# Patient Record
Sex: FEMALE | Race: WHITE | NOT HISPANIC OR LATINO | Employment: FULL TIME | URBAN - METROPOLITAN AREA
[De-identification: names, ages, dates, MRNs, and addresses within clinical notes are randomized per-mention and may not be internally consistent; named-entity substitution may affect disease eponyms.]

---

## 2017-07-28 ENCOUNTER — RECORDS - HEALTHEAST (OUTPATIENT)
Dept: LAB | Facility: CLINIC | Age: 32
End: 2017-07-28

## 2017-07-28 LAB
CHOLEST SERPL-MCNC: 149 MG/DL
FASTING STATUS PATIENT QL REPORTED: YES
HDLC SERPL-MCNC: 35 MG/DL
LDLC SERPL CALC-MCNC: 86 MG/DL
TRIGL SERPL-MCNC: 142 MG/DL

## 2017-08-04 LAB
BKR LAB AP ABNORMAL BLEEDING: NO
BKR LAB AP BIRTH CONTROL/HORMONES: NORMAL
BKR LAB AP CERVICAL APPEARANCE: NORMAL
BKR LAB AP GYN ADEQUACY: NORMAL
BKR LAB AP GYN INTERPRETATION: NORMAL
BKR LAB AP HPV REFLEX: NO
BKR LAB AP LMP: NORMAL
BKR LAB AP PATIENT STATUS: NORMAL
BKR LAB AP PREVIOUS ABNORMAL: NORMAL
BKR LAB AP PREVIOUS NORMAL: NORMAL
HIGH RISK?: NO
PATH REPORT.COMMENTS IMP SPEC: NORMAL
RESULT FLAG (HE HISTORICAL CONVERSION): NORMAL

## 2017-12-15 ENCOUNTER — OFFICE VISIT - HEALTHEAST (OUTPATIENT)
Dept: FAMILY MEDICINE | Facility: CLINIC | Age: 32
End: 2017-12-15

## 2017-12-15 DIAGNOSIS — J01.00 ACUTE NON-RECURRENT MAXILLARY SINUSITIS: ICD-10-CM

## 2017-12-15 DIAGNOSIS — J40 BRONCHITIS: ICD-10-CM

## 2017-12-18 ENCOUNTER — OFFICE VISIT - HEALTHEAST (OUTPATIENT)
Dept: FAMILY MEDICINE | Facility: CLINIC | Age: 32
End: 2017-12-18

## 2017-12-18 DIAGNOSIS — R05.9 COUGH: ICD-10-CM

## 2017-12-18 DIAGNOSIS — J32.9 SINUSITIS: ICD-10-CM

## 2018-01-25 ENCOUNTER — RECORDS - HEALTHEAST (OUTPATIENT)
Dept: ADMINISTRATIVE | Facility: OTHER | Age: 33
End: 2018-01-25

## 2018-01-25 ENCOUNTER — HOSPITAL ENCOUNTER (OUTPATIENT)
Dept: ULTRASOUND IMAGING | Facility: CLINIC | Age: 33
Discharge: HOME | End: 2018-01-25
Attending: OBSTETRICS & GYNECOLOGY

## 2018-01-25 DIAGNOSIS — Z09 FOLLOW UP: ICD-10-CM

## 2018-09-04 ENCOUNTER — OFFICE VISIT - HEALTHEAST (OUTPATIENT)
Dept: FAMILY MEDICINE | Facility: CLINIC | Age: 33
End: 2018-09-04

## 2018-09-04 DIAGNOSIS — L81.4 LENTIGO: ICD-10-CM

## 2018-09-04 DIAGNOSIS — E28.2 POLYCYSTIC OVARIES: ICD-10-CM

## 2018-09-04 DIAGNOSIS — E66.01 MORBID OBESITY WITH BMI OF 40.0-44.9, ADULT (H): ICD-10-CM

## 2018-09-04 DIAGNOSIS — L83 ACANTHOSIS NIGRICANS: ICD-10-CM

## 2018-09-04 DIAGNOSIS — Z00.00 WELL ADULT EXAM: ICD-10-CM

## 2018-09-04 DIAGNOSIS — F43.25 ADJUSTMENT REACTION WITH MIXED DISTURBANCE OF EMOTIONS AND CONDUCT: ICD-10-CM

## 2018-09-04 LAB
ALBUMIN SERPL-MCNC: 3.9 G/DL (ref 3.5–5)
ALBUMIN UR-MCNC: NEGATIVE MG/DL
ALP SERPL-CCNC: 106 U/L (ref 45–120)
ALT SERPL W P-5'-P-CCNC: 22 U/L (ref 0–45)
ANION GAP SERPL CALCULATED.3IONS-SCNC: 10 MMOL/L (ref 5–18)
APPEARANCE UR: CLEAR
AST SERPL W P-5'-P-CCNC: 16 U/L (ref 0–40)
BACTERIA #/AREA URNS HPF: ABNORMAL HPF
BASOPHILS # BLD AUTO: 0.1 THOU/UL (ref 0–0.2)
BASOPHILS NFR BLD AUTO: 1 % (ref 0–2)
BILIRUB SERPL-MCNC: 0.4 MG/DL (ref 0–1)
BILIRUB UR QL STRIP: NEGATIVE
BUN SERPL-MCNC: 13 MG/DL (ref 8–22)
CALCIUM SERPL-MCNC: 10.1 MG/DL (ref 8.5–10.5)
CHLORIDE BLD-SCNC: 107 MMOL/L (ref 98–107)
CO2 SERPL-SCNC: 25 MMOL/L (ref 22–31)
COLOR UR AUTO: YELLOW
CREAT SERPL-MCNC: 0.7 MG/DL (ref 0.6–1.1)
EOSINOPHIL # BLD AUTO: 0.3 THOU/UL (ref 0–0.4)
EOSINOPHIL NFR BLD AUTO: 2 % (ref 0–6)
ERYTHROCYTE [DISTWIDTH] IN BLOOD BY AUTOMATED COUNT: 12.3 % (ref 11–14.5)
GFR SERPL CREATININE-BSD FRML MDRD: >60 ML/MIN/1.73M2
GLUCOSE BLD-MCNC: 84 MG/DL (ref 70–125)
GLUCOSE UR STRIP-MCNC: NEGATIVE MG/DL
HCT VFR BLD AUTO: 40.6 % (ref 35–47)
HGB BLD-MCNC: 14.1 G/DL (ref 12–16)
HGB UR QL STRIP: ABNORMAL
INSULIN SERPL-ACNC: 21 MU/L (ref 3–25)
IRON SATN MFR SERPL: 21 % (ref 20–50)
IRON SERPL-MCNC: 75 UG/DL (ref 42–175)
KETONES UR STRIP-MCNC: NEGATIVE MG/DL
LEUKOCYTE ESTERASE UR QL STRIP: ABNORMAL
LYMPHOCYTES # BLD AUTO: 2.7 THOU/UL (ref 0.8–4.4)
LYMPHOCYTES NFR BLD AUTO: 24 % (ref 20–40)
MCH RBC QN AUTO: 30.9 PG (ref 27–34)
MCHC RBC AUTO-ENTMCNC: 34.6 G/DL (ref 32–36)
MCV RBC AUTO: 89 FL (ref 80–100)
MONOCYTES # BLD AUTO: 0.7 THOU/UL (ref 0–0.9)
MONOCYTES NFR BLD AUTO: 6 % (ref 2–10)
NEUTROPHILS # BLD AUTO: 7.6 THOU/UL (ref 2–7.7)
NEUTROPHILS NFR BLD AUTO: 67 % (ref 50–70)
NITRATE UR QL: NEGATIVE
PH UR STRIP: 6 [PH] (ref 5–8)
PLATELET # BLD AUTO: 301 THOU/UL (ref 140–440)
PMV BLD AUTO: 8.6 FL (ref 7–10)
POTASSIUM BLD-SCNC: 4.4 MMOL/L (ref 3.5–5)
PROT SERPL-MCNC: 6.9 G/DL (ref 6–8)
RBC # BLD AUTO: 4.55 MILL/UL (ref 3.8–5.4)
RBC #/AREA URNS AUTO: ABNORMAL HPF
SODIUM SERPL-SCNC: 142 MMOL/L (ref 136–145)
SP GR UR STRIP: 1.02 (ref 1–1.03)
SQUAMOUS #/AREA URNS AUTO: ABNORMAL LPF
T3 SERPL-MCNC: 131 NG/DL (ref 45–175)
T4 FREE SERPL-MCNC: 0.9 NG/DL (ref 0.7–1.8)
THYROID PEROXIDASE ANTIBODIES - HISTORICAL: <3 IU/ML (ref 0–5.6)
TIBC SERPL-MCNC: 352 UG/DL (ref 313–563)
TRANS CELLS #/AREA URNS HPF: ABNORMAL LPF
TRANSFERRIN SERPL-MCNC: 281 MG/DL (ref 212–360)
TSH SERPL DL<=0.005 MIU/L-ACNC: 1.98 UIU/ML (ref 0.3–5)
UROBILINOGEN UR STRIP-ACNC: ABNORMAL
VIT B12 SERPL-MCNC: 309 PG/ML (ref 213–816)
WBC #/AREA URNS AUTO: ABNORMAL HPF
WBC: 11.3 THOU/UL (ref 4–11)

## 2018-09-05 LAB
25(OH)D3 SERPL-MCNC: 20.4 NG/ML (ref 30–80)
BACTERIA SPEC CULT: NORMAL
DHEA-S SERPL-MCNC: 249 UG/DL (ref 45–270)

## 2018-09-07 LAB
CHOLEST SERPL-MCNC: 170 MG/DL
HDL SERPL QN: 8.5 NM
HDL SERPL-SCNC: 37.5 UMOL/L
HDLC SERPL-MCNC: 44 MG/DL (ref 40–59)
HLD.LARGE SERPL-SCNC: <2.8 UMOL/L
LDL SERPL QN: 20.6 NM
LDL SERPL-SCNC: 1563 NMOL/L
LDL SMALL SERPL-SCNC: 843 NMOL/L
LDLC SERPL CALC-MCNC: 96 MG/DL
PATHOLOGY STUDY: ABNORMAL
TRIGL SERPL-MCNC: 152 MG/DL (ref 30–149)
VLDL LARGE SERPL-SCNC: 6.3 NMOL/L
VLDL SERPL QN: 51.9 NM

## 2018-09-12 ENCOUNTER — COMMUNICATION - HEALTHEAST (OUTPATIENT)
Dept: FAMILY MEDICINE | Facility: CLINIC | Age: 33
End: 2018-09-12

## 2018-09-25 ENCOUNTER — RECORDS - HEALTHEAST (OUTPATIENT)
Dept: ADMINISTRATIVE | Facility: OTHER | Age: 33
End: 2018-09-25

## 2018-12-19 ENCOUNTER — COMMUNICATION - HEALTHEAST (OUTPATIENT)
Dept: FAMILY MEDICINE | Facility: CLINIC | Age: 33
End: 2018-12-19

## 2019-02-20 ENCOUNTER — RECORDS - HEALTHEAST (OUTPATIENT)
Dept: ADMINISTRATIVE | Facility: OTHER | Age: 34
End: 2019-02-20

## 2019-03-05 ENCOUNTER — HOSPITAL ENCOUNTER (OUTPATIENT)
Dept: RADIOLOGY | Facility: CLINIC | Age: 34
Discharge: HOME | End: 2019-03-05
Admitting: RADIOLOGY

## 2019-03-05 DIAGNOSIS — Z31.41 FERTILITY TESTING: ICD-10-CM

## 2019-03-12 ENCOUNTER — RECORDS - HEALTHEAST (OUTPATIENT)
Dept: ADMINISTRATIVE | Facility: OTHER | Age: 34
End: 2019-03-12

## 2019-03-18 ENCOUNTER — HOSPITAL ENCOUNTER (OUTPATIENT)
Dept: ULTRASOUND IMAGING | Facility: CLINIC | Age: 34
Discharge: HOME | End: 2019-03-18

## 2019-03-18 DIAGNOSIS — Z31.41 FERTILITY TESTING: ICD-10-CM

## 2019-09-13 ENCOUNTER — OFFICE VISIT - HEALTHEAST (OUTPATIENT)
Dept: FAMILY MEDICINE | Facility: CLINIC | Age: 34
End: 2019-09-13

## 2019-09-13 DIAGNOSIS — N39.0 URINARY TRACT INFECTION WITHOUT HEMATURIA, SITE UNSPECIFIED: ICD-10-CM

## 2019-09-13 DIAGNOSIS — R10.2 SUPRAPUBIC PAIN: ICD-10-CM

## 2019-09-13 DIAGNOSIS — R50.9 FEVER, UNSPECIFIED FEVER CAUSE: ICD-10-CM

## 2019-09-13 LAB
ALBUMIN UR-MCNC: NEGATIVE MG/DL
APPEARANCE UR: CLEAR
BACTERIA #/AREA URNS HPF: ABNORMAL HPF
BILIRUB UR QL STRIP: NEGATIVE
COLOR UR AUTO: YELLOW
GLUCOSE UR STRIP-MCNC: NEGATIVE MG/DL
HGB UR QL STRIP: NEGATIVE
KETONES UR STRIP-MCNC: NEGATIVE MG/DL
LEUKOCYTE ESTERASE UR QL STRIP: ABNORMAL
MUCOUS THREADS #/AREA URNS LPF: ABNORMAL LPF
NITRATE UR QL: NEGATIVE
PH UR STRIP: 6.5 [PH] (ref 5–8)
RBC #/AREA URNS AUTO: ABNORMAL HPF
SP GR UR STRIP: 1.02 (ref 1–1.03)
SQUAMOUS #/AREA URNS AUTO: ABNORMAL LPF
UROBILINOGEN UR STRIP-ACNC: ABNORMAL
WBC #/AREA URNS AUTO: ABNORMAL HPF

## 2019-09-14 LAB — BACTERIA SPEC CULT: NO GROWTH

## 2019-10-11 ENCOUNTER — COMMUNICATION - HEALTHEAST (OUTPATIENT)
Dept: FAMILY MEDICINE | Facility: CLINIC | Age: 34
End: 2019-10-11

## 2020-04-03 ENCOUNTER — VIRTUAL VISIT (OUTPATIENT)
Dept: FAMILY MEDICINE | Facility: OTHER | Age: 35
End: 2020-04-03

## 2020-04-03 NOTE — PROGRESS NOTES
"Date: 2020 08:21:38  Clinician: Coreen Campuzano  Clinician NPI: [id number]  Patient: Jayda Ty  Patient : 1985  Patient Address: [address] [place] [address]  Patient Phone: [phone]  Visit Protocol: URI  Patient Summary:  Jayda is a 35 year old ( : 1985 ) female who initiated a Visit for COVID-19 (Coronavirus) evaluation and screening. When asked the question \"Please sign me up to receive news, health information and promotions from [employer].\", Jayda responded \"Yes\".    Jayda states her symptoms started gradually 3-6 days ago.   Her symptoms consist of a sore throat, a cough, nasal congestion, malaise, and enlarged lymph nodes. She is experiencing mild difficulty breathing with activities but can speak normally in full sentences.   Symptom details     Nasal secretions: The color of her mucus is yellow and green.    Cough: Jayda coughs every 5-10 minutes and her cough is not more bothersome at night. Phlegm comes into her throat when she coughs. She does not believe her cough is caused by post-nasal drip. The color of the phlegm is green and yellow.     Sore throat: Jayda reports having moderate throat pain (4-6 on a 10 point pain scale), does not have exudate on her tonsils, and can swallow liquids. The lymph nodes in her neck are enlarged. A rash has not appeared on the skin since the sore throat started.      Jayda denies having teeth pain, headache, fever, facial pain or pressure, myalgias, chills, wheezing, ear pain, and rhinitis. She also denies double sickening (worsening symptoms after initial improvement), taking antibiotic medication for the symptoms, and having recent facial or sinus surgery in the past 60 days.   Precipitating events  Within the past week, Jayda has not been exposed to someone with strep throat. She has not recently been exposed to someone with influenza. Jayda has been in close contact with the following high risk individuals: " pregnant women and children under the age of 5.   Pertinent COVID-19 (Coronavirus) information  Jayda has not traveled internationally or to the areas where COVID-19 (Coronavirus) is widespread, including cruise ship travel in the last 14 days before the start of her symptoms.   Jayda has not had a close contact with a laboratory-confirmed COVID-19 patient within 14 days of symptom onset. She also has not had a close contact with a suspected COVID-19 patient within 14 days of symptom onset.   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 She provides direct patient care. She lives with a healthcare worker.   Pertinent medical history  Jayda does not get yeast infections when she takes antibiotics.   Jayda does not need a return to work/school note.   Weight: 230 lbs   Jayda does not smoke or use smokeless tobacco.   She denies pregnancy and denies breastfeeding. She is currently menstruating.   Additional information as reported by the patient (free text): Can breath out of my nose , but have lost my sense of taste and smell, right eye is slightly inflamed , woke up with it sealed shut multiple times during the night   Weight: 230 lbs  A synchronous phone visit was initiated by the provider for the following reason: clarify symptoms    MEDICATIONS: No current medications, ALLERGIES: NKDA  Clinician Response:  Dear Jayda,   Based on the information you have provided, you do have symptoms that are consistent with Coronavirus (COVID-19).  The coronavirus causes mild to severe respiratory illness with the most common symptoms including fever, cough and difficulty breathing. Unfortunately, many viruses cause similar symptoms and it can be difficult to distinguish between viruses, especially in mild cases, so we are presuming that anyone with cough or fever has coronavirus at this time.  Coronavirus/COVID-19 has reached the point of community spread in [place],  meaning that we are finding the virus in people with no known exposure risk for wallace the virus. Given the increasing commonness of coronavirus in the community we are no longer testing patients who are not critically ill.  If you are a health care worker, you should refer to your employee health office for instructions about testing and returning to work.  For everyone else who has cough or fever, you should assume you are infected with coronavirus. Since you will not be tested but have symptoms that may be consistent with coronavirus, the CDC recommends you stay in self-isolation until these three things have happened:    You have had no fever for at least 72 hours (that is three full days of no fever without the use of medicine that reduces fevers)    AND   Other symptoms have improved (for example, when your cough or shortness of breath have improved)   AND   At least 7 days have passed since your symptoms first appeared.   How to Isolate:   Isolate yourself at home.  Do Not allow any visitors  Do Not go to work or school  Do Not go to [employer]ism,  centers, shopping, or other public places.  Do Not shake hands.  Avoid close contact with others (hugging, kissing).   Protect Others:   Cover Your Mouth and Nose with a mask, disposable tissue or wash cloth to avoid spreading germs to others.  Wash your hands and face frequently with soap and water.   We know it can be scary to hear that you might have COVID-19. Our team can help track your symptoms and make sure you are doing ok over the next two weeks using a program called [employer] to keep in touch. When you receive an email from [employer], please consider enrolling in our monitoring program. There is no cost to you for monitoring. Here is a URL where you can learn more: [email or web address]  Managing Symptoms:   At this time, we primarily recommend Tylenol (Acetaminophen) for fever or pain. If you have liver or kidney problems,  contact your primary care provider for instructions on use of tylenol. Adults can take 650 mg (two 325 mg pills) by mouth every 4-6 hours as needed OR 1,000 mg (two 500 mg pills) every 8 hours as needed. MAXIMUM DAILY DOSE: 3,000mg. For children, refer to dosing on bottle based on age or weight.   If you develop significant shortness of breath that prevents you from doing normal activities, please call 911 or proceed to the nearest emergency room and alert them immediately that you have been in self-isolation for possible coronavirus.  If you have a higher risk medical condition such as cancer, heart failure, end stage renal disease on dialysis or have a transplant, please reach out to your specialist's clinic to advise them of your [facility] visit should you not improve within the next two days.   For more information about COVID19 and options for caring for yourself at home, please visit the CDC website at https://www.cdc.gov/coronavirus/2019-ncov/about/steps-when-sick.htmlFor more options for care at [facility], please visit our website at [email or web address]    Diagnosis: Cough  Diagnosis ICD: R05  Triage Notes: I reviewed the patient's history, verified their identity, and explained the Visit process.    Patient's symptoms consistent with HPI above. States that her right eye was crusty last night. Denies vision changes, pain with movement of eye. Recommended frequent hand washing as viral pink eye is easily spread. Use a new wash cloth with each cleaning. States that she will notes chest tightness with coughing. Is able to speak in complete sentences without difficulty. Counseled on [facility] [facility] and warning signs.  Synchronous Triage: phone, status: completed, duration: 216 seconds

## 2020-04-10 ENCOUNTER — OFFICE VISIT - HEALTHEAST (OUTPATIENT)
Dept: FAMILY MEDICINE | Facility: CLINIC | Age: 35
End: 2020-04-10

## 2020-04-10 DIAGNOSIS — R05.9 COUGH: ICD-10-CM

## 2020-04-11 ENCOUNTER — RECORDS - HEALTHEAST (OUTPATIENT)
Dept: LAB | Facility: CLINIC | Age: 35
End: 2020-04-11

## 2020-04-11 LAB
SARS-COV-2 PCR COMMENT: NORMAL
SARS-COV-2 RNA SPEC QL NAA+PROBE: NEGATIVE
SARS-COV-2 VIRUS SPECIMEN SOURCE: NORMAL

## 2020-05-01 ENCOUNTER — TRANSFERRED RECORDS (OUTPATIENT)
Dept: HEALTH INFORMATION MANAGEMENT | Facility: CLINIC | Age: 35
End: 2020-05-01
Payer: COMMERCIAL

## 2020-05-01 LAB — PAP SMEAR - HIM PATIENT REPORTED: NEGATIVE

## 2020-12-15 ENCOUNTER — OFFICE VISIT - HEALTHEAST (OUTPATIENT)
Dept: SURGERY | Facility: CLINIC | Age: 35
End: 2020-12-15

## 2020-12-15 ENCOUNTER — COMMUNICATION - HEALTHEAST (OUTPATIENT)
Dept: SURGERY | Facility: CLINIC | Age: 35
End: 2020-12-15

## 2020-12-15 DIAGNOSIS — E28.2 POLYCYSTIC OVARIES: ICD-10-CM

## 2020-12-15 DIAGNOSIS — E66.01 MORBID OBESITY WITH BMI OF 40.0-44.9, ADULT (H): ICD-10-CM

## 2020-12-15 NOTE — ASSESSMENT & PLAN NOTE
We discussed insulin resistance and how it can interfere with weight loss efforts. We discussed dietary changes to reduceinsulin resistance. We discussed the use of metformin. WE could consider trying this in the future. She got stomach upset from it in the past but never took it regularly.

## 2020-12-15 NOTE — ASSESSMENT & PLAN NOTE
We discussed healthy habits to assist with weight loss. She will work on planning her meals ahead of time, following the plate method for portion control. Hopefully this will help her to decrease eating out. She plans to join the comprehensive 24 week program. She is unsure whether or not shewants to use meal replacement product. We discussed medication that may assist with weight loss. wellbutrin and nalrtexone were prescribed. Risks/ benefits and possible side effects were discussed and questions were answered. Written information was given.

## 2020-12-21 ENCOUNTER — OFFICE VISIT - HEALTHEAST (OUTPATIENT)
Dept: SURGERY | Facility: CLINIC | Age: 35
End: 2020-12-21

## 2020-12-21 DIAGNOSIS — E66.9 OBESITY: ICD-10-CM

## 2020-12-21 DIAGNOSIS — E66.813 CLASS 3 SEVERE OBESITY DUE TO EXCESS CALORIES WITHOUT SERIOUS COMORBIDITY WITH BODY MASS INDEX (BMI) OF 50.0 TO 59.9 IN ADULT (H): ICD-10-CM

## 2020-12-21 DIAGNOSIS — Z71.3 NUTRITIONAL COUNSELING: ICD-10-CM

## 2020-12-21 DIAGNOSIS — E66.01 CLASS 3 SEVERE OBESITY DUE TO EXCESS CALORIES WITHOUT SERIOUS COMORBIDITY WITH BODY MASS INDEX (BMI) OF 50.0 TO 59.9 IN ADULT (H): ICD-10-CM

## 2021-01-06 ENCOUNTER — COMMUNICATION - HEALTHEAST (OUTPATIENT)
Dept: SURGERY | Facility: CLINIC | Age: 36
End: 2021-01-06

## 2021-01-06 ENCOUNTER — AMBULATORY - HEALTHEAST (OUTPATIENT)
Dept: SURGERY | Facility: CLINIC | Age: 36
End: 2021-01-06

## 2021-01-06 DIAGNOSIS — E28.2 POLYCYSTIC OVARIES: ICD-10-CM

## 2021-01-06 RX ORDER — METFORMIN HCL 500 MG
TABLET, EXTENDED RELEASE 24 HR ORAL
Qty: 180 TABLET | Refills: 1 | Status: SHIPPED | OUTPATIENT
Start: 2021-01-06 | End: 2022-03-03

## 2021-01-13 ENCOUNTER — OFFICE VISIT - HEALTHEAST (OUTPATIENT)
Dept: FAMILY MEDICINE | Facility: CLINIC | Age: 36
End: 2021-01-13

## 2021-01-13 DIAGNOSIS — E66.9 OBESITY: ICD-10-CM

## 2021-02-03 ENCOUNTER — COMMUNICATION - HEALTHEAST (OUTPATIENT)
Dept: FAMILY MEDICINE | Facility: CLINIC | Age: 36
End: 2021-02-03

## 2021-02-12 ENCOUNTER — OFFICE VISIT - HEALTHEAST (OUTPATIENT)
Dept: FAMILY MEDICINE | Facility: CLINIC | Age: 36
End: 2021-02-12

## 2021-02-12 ENCOUNTER — COMMUNICATION - HEALTHEAST (OUTPATIENT)
Dept: SURGERY | Facility: CLINIC | Age: 36
End: 2021-02-12

## 2021-02-12 DIAGNOSIS — E66.9 OBESITY: ICD-10-CM

## 2021-04-28 ENCOUNTER — COMMUNICATION - HEALTHEAST (OUTPATIENT)
Dept: FAMILY MEDICINE | Facility: CLINIC | Age: 36
End: 2021-04-28

## 2021-05-25 ENCOUNTER — RECORDS - HEALTHEAST (OUTPATIENT)
Dept: ADMINISTRATIVE | Facility: CLINIC | Age: 36
End: 2021-05-25

## 2021-05-27 ENCOUNTER — RECORDS - HEALTHEAST (OUTPATIENT)
Dept: ADMINISTRATIVE | Facility: CLINIC | Age: 36
End: 2021-05-27

## 2021-05-31 VITALS — WEIGHT: 239.75 LBS | BODY MASS INDEX: 43.85 KG/M2

## 2021-05-31 VITALS — BODY MASS INDEX: 44.37 KG/M2 | WEIGHT: 242.56 LBS

## 2021-06-01 ENCOUNTER — RECORDS - HEALTHEAST (OUTPATIENT)
Dept: ADMINISTRATIVE | Facility: CLINIC | Age: 36
End: 2021-06-01

## 2021-06-01 VITALS — BODY MASS INDEX: 44.17 KG/M2 | WEIGHT: 241.5 LBS

## 2021-06-01 NOTE — PROGRESS NOTES
Assessment & Plan  1. Fever, unspecified fever cause  As below  - Urinalysis-UC if Indicated  - Culture, Urine  - ciprofloxacin HCl (CIPRO) 500 MG tablet; Take 1 tablet (500 mg total) by mouth 2 (two) times a day for 3 days.  Dispense: 6 tablet; Refill: 0    2. Suprapubic pain  As below  - Urinalysis-UC if Indicated  - Culture, Urine  - ciprofloxacin HCl (CIPRO) 500 MG tablet; Take 1 tablet (500 mg total) by mouth 2 (two) times a day for 3 days.  Dispense: 6 tablet; Refill: 0    3. Urinary tract infection without hematuria, site unspecified  Likely cause of symptoms given positive LE/bacteria on UA and with suprapubic pain. Did pass a stone recently and now has no flank pain and no CVA tenderness.  Given that she has had fevers, will treat as a mild pyelonephritis.  Urine pregnancy test negative on 9/11.    Advised if worsening pain, decreased urination, high fever, vomiting to go to ER    - ciprofloxacin HCl (CIPRO) 500 MG tablet; Take 1 tablet (500 mg total) by mouth 2 (two) times a day for 3 days.  Dispense: 6 tablet; Refill: 0      Coreen Bowens MD    Subjective  Chief Complaint:  Fever (pasted kidney stones on monday and has had fever since)    HPI:   Jayda Ty is a 34 y.o. female who presents for fever  Was recently in ER due to flank pain and was found to have UPJ hydronephrosis and a stone 5 mm.  She actually passed the stone, which was sent to lab. The sone was Clacium Oxalate/phosphate    The flank pain that she had resolved after she passed the stone    She has a little bit of left lower quad pain, suprapubic pain and pressure.  No dysuria.  If having increased frequency    No UTI in the past, no history of stones previously.     Fever for 2 nights, last fever yesterday.  Woke up sweating last night.  Took antipyric 3 AM  No vomiting    Allergies:  has No Known Allergies.    SH/FH:  Social History and Family History reviewed and updated.   Tobacco Status:  She  reports that she has never  smoked. She has never used smokeless tobacco.    Review of Systems:    Constitutional:  Fever, No Chills, No Night Sweats,   Respiratory: No Cough,   Gastrointestinal: No Nausea, No Vomiting, No Diarrhea, No Constipation, supropubic Pain,   Genitourinary: No Dysuria   Musculoskeletal: No Arthralgias  Skin: No Skin Lesions    Objective  Vitals:    09/13/19 1125   BP: 100/64   Pulse: 70   Temp: 97.6  F (36.4  C)   SpO2: 100%   Weight: (!) 230 lb (104.3 kg)       Physical Exam:  GENERAL: Alert, well-appearing, in no acute distress.   RESP: No increased work of breathing.    ABDOMEN: No abdominal distension. Abdomen soft, mild tenderness suprapubic and LLQ with deep palpation  No CVA tenderness    UA   10-25 sq cells  +leukocyte esterase  +bacteria

## 2021-06-01 NOTE — PATIENT INSTRUCTIONS - HE
1. I'll call with urine results  2. If you are worse: side pain, fevers past 1 days, vomiting then you should be seen in the hospital  3. Culture will come back on Monday     Patient Education   Ciprofloxacin Hydrochloride Oral tablet  What is this medicine?  CIPROFLOXACIN (sip juan ramon FLOX a sin) is a quinolone antibiotic. It is used to treat certain kinds of bacterial infections. It will not work for colds, flu, or other viral infections.  This medicine may be used for other purposes; ask your health care provider or pharmacist if you have questions.  What should I tell my health care provider before I take this medicine?  They need to know if you have any of these conditions:    bone problems    cerebral disease    joint problems    irregular heartbeat    kidney disease    liver disease    myasthenia gravis    seizure disorder    tendon problems    an unusual or allergic reaction to ciprofloxacin, other antibiotics or medicines, foods, dyes, or preservatives    pregnant or trying to get pregnant    breast-feeding  How should I use this medicine?  Take this medicine by mouth with a glass of water. Follow the directions on the prescription label. Take your medicine at regular intervals. Do not take your medicine more often than directed. Take all of your medicine as directed even if you think your are better. Do not skip doses or stop your medicine early.  You can take this medicine with food or on an empty stomach. It can be taken with a meal that contains dairy or calcium, but do not take it alone with a dairy product, like milk or yogurt or calcium-fortified juice.  A special MedGuide will be given to you by the pharmacist with each prescription and refill. Be sure to read this information carefully each time.  Talk to your pediatrician regarding the use of this medicine in children. Special care may be needed.  Overdosage: If you think you have taken too much of this medicine contact a poison control center or  emergency room at once.  NOTE: This medicine is only for you. Do not share this medicine with others.  What if I miss a dose?  If you miss a dose, take it as soon as you can. If it is almost time for your next dose, take only that dose. Do not take double or extra doses.  What may interact with this medicine?  Do not take this medicine with any of the following medications:    cisapride    droperidol    terfenadine    tizanidine  This medicine may also interact with the following medications:    antacids    birth control pills    caffeine    cyclosporin    didanosine (ddI) buffered tablets or powder    medicines for diabetes    medicines for inflammation like ibuprofen, naproxen    methotrexate    multivitamins    omeprazole    phenytoin    probenecid    sucralfate    theophylline    warfarin  This list may not describe all possible interactions. Give your health care provider a list of all the medicines, herbs, non-prescription drugs, or dietary supplements you use. Also tell them if you smoke, drink alcohol, or use illegal drugs. Some items may interact with your medicine.  What should I watch for while using this medicine?  Tell your doctor or health care professional if your symptoms do not improve.  Do not treat diarrhea with over the counter products. Contact your doctor if you have diarrhea that lasts more than 2 days or if it is severe and watery.  You may get drowsy or dizzy. Do not drive, use machinery, or do anything that needs mental alertness until you know how this medicine affects you. Do not stand or sit up quickly, especially if you are an older patient. This reduces the risk of dizzy or fainting spells.  This medicine can make you more sensitive to the sun. Keep out of the sun. If you cannot avoid being in the sun, wear protective clothing and use sunscreen. Do not use sun lamps or tanning beds/booths.  Avoid antacids, aluminum, calcium, iron, magnesium, and zinc products for 6 hours before and 2  hours after taking a dose of this medicine.  What side effects may I notice from receiving this medicine?  Side effects that you should report to your doctor or health care professional as soon as possible:    allergic reactions like skin rash, itching or hives, swelling of the face, lips, or tongue    breathing problems    confusion, nightmares or hallucinations    feeling faint or lightheaded, falls    irregular heartbeat    joint, muscle or tendon pain or swelling    pain or trouble passing urine    persistent headache with or without blurred vision    redness, blistering, peeling or loosening of the skin, including inside the mouth    seizure    unusual pain, numbness, tingling, or weakness  Side effects that usually do not require medical attention (report to your doctor or health care professional if they continue or are bothersome):    diarrhea    nausea or stomach upset    white patches or sores in the mouth  This list may not describe all possible side effects. Call your doctor for medical advice about side effects. You may report side effects to FDA at 9-966-FDA-0888.  Where should I keep my medicine?  Keep out of the reach of children.  Store at room temperature below 30 degrees C (86 degrees F). Keep container tightly closed. Throw away any unused medicine after the expiration date.  NOTE:This sheet is a summary. It may not cover all possible information. If you have questions about this medicine, talk to your doctor, pharmacist, or health care provider. Copyright  2015 [employer]

## 2021-06-02 ENCOUNTER — RECORDS - HEALTHEAST (OUTPATIENT)
Dept: ADMINISTRATIVE | Facility: CLINIC | Age: 36
End: 2021-06-02

## 2021-06-03 VITALS
HEART RATE: 70 BPM | BODY MASS INDEX: 42.07 KG/M2 | TEMPERATURE: 97.6 F | DIASTOLIC BLOOD PRESSURE: 64 MMHG | SYSTOLIC BLOOD PRESSURE: 100 MMHG | WEIGHT: 230 LBS | OXYGEN SATURATION: 100 %

## 2021-06-05 VITALS — HEIGHT: 62 IN | BODY MASS INDEX: 42.07 KG/M2

## 2021-06-07 NOTE — PROGRESS NOTES
SUBJECTIVE: Here for curbside evaluation for COVID-19 referred through [facility]. Confirmed [facility] with name and date of birth for specimen collection.         1. Cough       Instructed patient to follow up with [facility] for return to work process. If questions or concerns arise advised to follow up with [facility] team.     Sruthi Asher

## 2021-06-13 NOTE — PATIENT INSTRUCTIONS - HE

## 2021-06-13 NOTE — PROGRESS NOTES
"    New Medical Weight Management Consult    PATIENT:  Jayda Ty  MRN:         [id number]  :         1985  NEPTALI:         12/15/2020        I had the pleasure of seeingJayda Ty.  Full intake/assessment was done to determine barriers to weight loss success and develop a treatment plan. Jayda Ty is a 35 y.o. female interested in treatment of medical problems associated with weight.     Vitals:    12/15/20 1300   Height: 5' 2\" (1.575 m)     Body mass index is 42.07 kg/m .      ASSESSMENT/ PLAN:    Morbid obesity with BMI of 40.0-44.9, adult (H)  We discussed healthy habits to assist with weight loss. She will work on planning her meals ahead of time, following the plate method for portion control. Hopefully this will help her to decrease eating out. She plans to join the comprehensive 24 week program. She is unsure whether or not she wants to use meal replacement product. We discussed medication that may assist with weight loss. wellbutrin and nalrtexone were prescribed. Risks/ benefits and possible side effects were discussed and questions were answered. Written information was given.     Polycystic Ovarian Syndrome  We discussed insulin resistance and how it can interfere with weight loss efforts. We discussed dietary changes to reduce insulin resistance. We discussed the use of metformin. WE could consider trying this in the future. She got stomach upset from it in the past but never took it regularly.      She has the following co-morbidities:    [facility] SURGERY CO-MORBIDITIES 12/15/2020   How has your weight changed over the last year?  Gained   How many pounds? 25   I have the following health issues associated with obesity: Polycystic Ovarian Syndrome, Infertility   I have the following symptoms associated with obesity: Infertility (Difficulty Getting Pregnant), Depression, Lower Extremity Swelling, Back Pain, Fatigue, Hip Pain, Irregular Menstral Cycle       Patient goals reviewed with " "patient 12/15/2020   I am interested in having a healthier weight to diminish current health problems: Yes   I am interested in having a healthier weight in order to prevent future health problems: Yes   I am interested in having a healthier weight in order to have a future surgery: No   I have the following family history of obesity/being overweight:  My mother is overweight, Many of my relatives are overweight   I have the following family history of obesity/being overweight:  My mother is overweight, Many of my relatives are overweight       Referring Provider 12/15/2020   Please name the provider who referred you to Medical Weight Management.  If you do not know, please answer: \"I Don't Know\". No one        Weight History Reviewed With Patient 12/15/2020   How concerned are you about your weight? Very Concerned   Would you describe your weight gain as gradual? Yes   I became overweight: As a Child   The following factors have contributed to my weight gain:  Mental Health Issues, Eating Wrong Types of Food, Eating Too Much, Genetic (Runs in the Family), Stress   My lowest weight since age 18 was: 145   My highest weight since age 18 was: 255   The most weight I have ever lost was: (lbs) 40- [place] Weight loss       Diet Recall Reviewed With Patient 12/15/2020   Do you typically eat breakfast? Yes   If you do eat breakfast, what types of food do you eat? Pascal/sausage , toast, eggs when she is not working, or something sweet and quick   Do you typically eat lunch? Yes   If you do eat lunch, what types of food do you typically eat?  Gardiner s, chips, fast food while I m out , processed meals, or grazes on snackcafeteria food if she is working   Do you typically eat supper? Yes   If you do eat supper, what types of food do you typically eat? A meat /potato/veggie, rice, noodles /sauce/bread, casseroles   Do you typically eat snacks? Yes- sometimes for a meal   If you do snack, what types of food do you typically eat? " Crackers /chips   Do you like vegetables?  She likes carrots and corn and peas and celery   Do you drink water?  Yes, 30 oz per day   How many glasses of juice do you drink in a typical day? 0   How many of glasses of milk do you drink in a typical day? 0   If you do drink milk, what type? N/A   How many 8oz glasses of sugar containing drinks such as Elian-Aid/sweet tea do you drink in a day? 0   How many cans/bottles of sugar pop/soda/tea/sports drinks do you drink in a day? 0   How many cans/bottles of sugar pop/soda/tea/sports drinks do you drink in a day? 3   How often do you have a drink of alcohol? Montly or Less       Eating Habits Reviewed With Patient 12/15/2020   Eats Starches Everyday   Generally, my meals include foods like these: fried meats, brats, burgers, french fries, pizza, cheese, chips, or ice cream. Almost Everyday   Eat fast food (like Bunndle, Twoodo, Taco Bell). A Few Times a Week   Buffet A Few Times a Week   Watches TV Almost Everyday   Often skip meals, eat at random times, have no regular eating times. Almost Everyday   Grazes Almost Everyday   Eat extra snacks between meals. Almost Everyday   Eat most of my food at the end of the day. Less Than Weekly   Eats at Night Never   Eat extra snacks to prevent or correct low blood sugar. Never   Eat to prevent acid reflux or stomach pain. Never   Worry about not having enough food to eat. Never   Have you been to the food shelf at least a few times this year? No   I eat when I am depressed. Everyday   I eat when I am stressed. Everyday   I eat when I am bored. Everyday   I eat when I am anxious. Everyday   I eat when I am happy or as a reward. Everyday   I feel hungry all the time even if I just have eaten. Everyday   Feeling full is important to me. Everyday   I finish all the food on my plate even if I am already full. Everyday   I can't resist eating delicious food or walk past the good food/smell. Everyday   I eat/snack without  noticing that I am eating. Everyday   I eat when I am preparing the meal. Everyday   I eat more than usual when I see others eating. Everyday   I have trouble not eating sweets, ice cream, cookies, or chips if they are around the house. Everyday   I think about food all day. Everyday   What foods, if any, do you crave? Sweets / Candy / Chocolate, Chips / Crackers     Meals not prepared at home per week: 10    Patient perceived barriers to weight loss: emotional eating, cravings    Activity/Exercise History Reviewed With Patient 12/15/2020   How much of a typical 12 hour day do you spend sitting? Half the Day   How much of a typical 12 hour day do you spend lying down? Half the Day   How much of a typical day do you spend walking/standing? Half the Day   How many hours (not including work) do you spend on the TV/Video Games/Computer/Tablet/Phone? 2-3 Hours   How many times a week are you active for the purpose of exercise? Once a Week- maybe a short walk   How many total minutes do you spend doing some activity for the purpose of exercising when you exercise? 15-30 Minutes   What keeps you from being more active? Too Tired, Unsure What To Do, Worried People Will Look at Me       PAST MEDICAL HISTORY:  Past Medical History:   Diagnosis Date     Aortic septal defect 2001    repaired     Arrest of descent, delivered, current hospitalization 3/7/2015     PCOS (polycystic ovarian syndrome)      Pregnant 3/2/2015       Work/Social History Reviewed With Patient 12/15/2020   My employment status is: Part-Time   My job is: RN   How much of your job is spent on the computer or phone? Less than 50%   How many hours do you spend commuting to work daily?  20 min   What is your marital status?  / In a Relationship   If in a relationship, is your significant other overweight? Yes   Do you have children? Yes   If you have children, are they overweight? No   Who do you live with?   and son   Are they supportive of your  health goals? Yes   Who does the food shopping?  Me       Mental Health History Reviewed With Patient 12/15/2020   Have you ever been physically or sexually abused? No   If yes, do you feel that the abuse is affecting your weight? N/A   If yes, would you like to talk to a counselor about the abuse? N/A   How often in the past 2 weeks have you felt little interest or pleasure in doing things? Nearly Every Day   Over the past 2 weeks how often have you felt down, depressed, or hopeless? Nearly Every Day       Sleep History Reviewed With Patient 12/15/2020   How many hours do you sleep at night? 8   Do you think that you snore loudly or has anybody ever heard you snore loudly (louder than talking or so loud it can be heard behind a shut door)? Yes   Has anyone seen or heard you stop breathing during your sleep? No   Do you often feel tired, fatigued, or sleepy during the day? Yes   Do you have a TV/Computer or other screens in your bedroom? No       MEDICATIONS:   No current outpatient medications on file.     No current facility-administered medications for this visit.        ALLERGIES:   No Known Allergies     ROS  General  Fatigue: yes  Sleep Quality:fair  HEENT  Hx of glaucoma: no  Vision changes: no  Cardiovascular  Chest Pain with Exertion: no  Palpitations: no  Hx of heart disease: ASD as a baby  Pulmonary  Shortness of breath at rest: no  Shortness of breath with exertion: no  Snoring: yes  Stop-bang score: 3  [place] Score: 10  Psychiatric  Moods Stable: yes  Endocrine  Polydipsia: no  Polyuria: no  Neurologic:  Hx of seizures: no  Dermatologic  Rashes: no    PHYSICAL EXAM:    GENERAL: Healthy, alert and no distress  EYES: Eyes grossly normal to inspection. No discharge or erythema, or obvious scleral/conjunctival abnormalities.  RESP: No audible wheeze, cough, or visible cyanosis.  No visible retractions or increased work of breathing.    NEURO: Cranial nerves grossly intact. Mentation and speech appropriate  for age.  PSYCH: Mentation appears normal, affect normal/bright, judgement and insight intact, normal speech and appearance well-groomed    FOLLOW-UP:    1 week with the dietician and in 3 months with myself.    TIME: 50 min spent on evaluation, management, counseling, education, & motivational interviewing with greater than 50 % of the total time was spent on counseling and coordinating care    Sincerely,    Raquel Toney MD

## 2021-06-13 NOTE — PROGRESS NOTES
"Jayda Ty is a 35 y.o. female who is being evaluated via a billable video visit.      The patient has been notified of following:     \"This video visit will be conducted via a call between you and your physician/provider. We have found that certain health care needs can be provided without the need for an in-person physical exam.  This service lets us provide the care you need with a video conversation.  If a prescription is necessary we can send it directly to your pharmacy.  If lab work is needed we can place an order for that and you can then stop by our lab to have the test done at a later time.    Video visits are billed at different rates depending on your insurance coverage. Please reach out to your insurance provider with any questions.    If during the course of the call the physician/provider feels a video visit is not appropriate, you will not be charged for this service.\"    Patient has given verbal consent to a Video visit? Yes  How would you like to obtain your AVS? AVS Preference: PushCallhart.  If dropped by the video visit, the video invitation should be sent to: Text to cell phone: [phone]   Will anyone else be joining your video visit? No        Video Start Time: 5305        Video-Visit Details    Type of service:  Video Visit    Video End Time (time video stopped): 2:35 PM  Originating Location (pt. Location): Home    Distant Location (provider location):  [facility] [place]     Platform used for Video Visit: Dasha Rick LPN    "

## 2021-06-13 NOTE — PROGRESS NOTES
SAMRA MARTINES Reason for Visit: 24 Week Healthy Lifestyle Program Initial Visit - Health Coaching  Referred by: Juan Patterson  Progress Notes:  New 24 Week Healthy Lifestyle Weight Management Health Coaching Note  Jayda Ty   MRN: [id number]  : 1985  NEPTALI: 2020    Health  Visit #: 1  Phone Visit    ASSESSMENT:  Initial Weight: 230 lbs (?) based off of Dr. Toney's visit on 12/15 with a BMI of 42.07 kg/m2   Initial Start Date:  12/15/2020  Graduation Date:  6/15/2021  Physician:  Dr. Toney  Current Weight (lbs): 230 lbs (?)  Average Daily Water (ounces): ask at visit #2  Weight Loss Medication: Naltrexone  Nutrition Plan: Real whole foods    PATIENT INFORMATION PROVIDED via email sent by Health :  1.) 24 Week Healthy Lifestyle Plan Overview:  Explained the structure of the 24-week plan, reviewed scheduling information, discussed all coaching sessions will be done via phone.   2.) Cooking and Exercise Class - Links provided by the Health .  3.) Support Group monthly topics, dates/times - flyer with dates provided by the Health   4.) Explain the role of a Health  & the FOUR Pillars of Health (Nutrition, Exercise/Activity/Movement, Sleep and Stress Management)    INITIAL INTAKE: - will complete at visit #2  The four pillars of well-being may impact your ability to manage weight.   Level of Satisfaction:   Rate your current level of satisfaction on a scale of 1-10 in each pillar.     Sleep (1 -10):     Movement/Activity (1 -10):     Nutrition (1 -10):     Stress Management (1 -10):     Questions: Use all or some of the following questions to get to know what the patient is wanting for herself/himself. Ask,  Is this a question that would be important to talk about for you?   1.) Which of the pillars are you wanting to focus on first and why?   2.) What are some things you have learned along the way about yourself and weight loss? Behaviors learned and emotional  eater  3.) What is really solid about you or what can you really count on about yourself?   4.) Is there anything else that you would like me to know?     WELLNESS VISION: 5 minute Visioning Exercise - will complete at visit #2 and #3    You may choose to close your eyes for this exercise. Start with three full breaths to bring your attention inward.    In your mind s eye, see yourself in the near future. You are your healthiest, happiest, and most true version of yourself. What do you see? What do you notice?     Invite patient to expand on this vision, and/or start  trying on  this vision this week.    NOTES:     to Federico and son Bishnu, who is 5yrs. 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  PCOS which impacts for fertility.  Wanting to add to her family and having trouble getting pregnant.  Emotional eats her feelings.  Weighs 40 lbs more now that she was she was pregnant with Bishnu.  Wants to be healthy for her family.  Cooped up and lonely with Covid-19.  Been in a funk.  Feeling guilt everyday.  Stuck.  Behavior and needs to be mindful of.    Interest:  Self-Compassion.  Doesn't feel a lot of self love.    FOLLOW-Up: 12/21 at 3pm with Ericka for RD visit #1, January 6 at 3:30 pm for HC #2    Info provided:  Emailing Jayda with info. On the cooking classes, exercise classes and info. For self compassion. Will talk more with her in upcoming visits.     SIGNATURE:  QASIM Garcia, [facility]  National Board Certified Health &   24 Week Healthy Lifestyle Program Health   [facility]  [email or web address]

## 2021-06-13 NOTE — PROGRESS NOTES
"Jayda Ty is a 35 y.o. female who is being evaluated via a billable video visit.      The patient has been notified of following:     \"This video visit will be conducted via a call between you and your physician/provider. We have found that certain health care needs can be provided without the need for an in-person physical exam.  This service lets us provide the care you need with a video conversation.  If a prescription is necessary we can send it directly to your pharmacy.  If lab work is needed we can place an order for that and you can then stop by our lab to have the test done at a later time.    Video visits are billed at different rates depending on your insurance coverage. Please reach out to your insurance provider with any questions.    If during the course of the call the physician/provider feels a video visit is not appropriate, you will not be charged for this service.\"    Patient has given verbal consent to a Video visit? Yes  How would you like to obtain your AVS? AVS Preference: [email or web address].  If dropped by the video visit, the video invitation should be sent to: Other e-mail: Science Fantasy  Will anyone else be joining your video visit? No        Video Start Time: 3:03 PM    Additional provider notes: None       Video-Visit Details    Type of service:  Video Visit    Video End Time (time video stopped): 3:25 pm   Originating Location (pt. Location): Home    Distant Location (provider location):  [facility] [place]     Platform used for Video Visit: Dasha Lucero RD      Initial 24 Week Lifestyle Program Nutrition Evaluation    Assessment:  Pt is being seen today for 24 Week Lifestyle Program nutritional evaluation. Today we reviewed current eating habits and level of physical activity, and instructed on the changes that are required for successful weight loss outcomes.    Start Date: 12/15/2020  Graduation Date: 6/15/2021      Weight Loss " Medication:Wellbutrin, Naltrexone     Pt Active Problem List Diagnosis:  Patient Active Problem List   Diagnosis     Polycystic Ovarian Syndrome     Murmurs     Gallbladder Polyp     Morbid obesity with BMI of 40.0-44.9, adult (H)     History of      Hx laparoscopic cholecystectomy     H/O atrial septal defect repair     Nephrolithiasis     Hydronephrosis with ureteropelvic junction (UPJ) obstruction     Right ureteral stone     Bandemia       Diabetes: No    Pt's Initial Weight: 255 lbs    Patient weight not recorded    Estimated RMR ([place]-St Jeor equation):   1807 kcals x 1.2 (sedentary) = 2168 kcals (for weight maintenance)    Recommended Protein Intake:  60-80 grams of protein/day     Vitamin/Mineral Supplementation: None     Food Recall/History:  Breakfast: granola bar or eggs (mid morning)   Lunch: sandwich, chips    Dinner: more convenience foods (orders out) or meat, vegetable, starch     Snacks: gold fish crackers or Cheez Its or cookies     Frequency of eating out: at least 3 times/week     Beverages:  1 Diet Pop daily   Water 30 oz/day   Sparkling Water, Crystal Light      Exercise: no set regimen     Nutrition Diagnosis (PES statement):  Overweight/Obesity (NC 3.3) related to overeating and poor lifestyle habits as evidenced by patient's subjective statements (excessive energy intake, lack of exercise) and BMI of 46.7 kg/m2.        Nutrition Intervention:    1. Reviewed the 24 Week Lifestyle Program guidelines and schedule.  2. Reviewed 8978-4849 calorie meal plan and food products.  3. Discussed building a healthy plate of 300-400 calories for one meal per day, focusing on 20-30g of lean protein and less than 25% of plate carbohydrates.  4. Encouraged patient to follow meal plan with Bariatrix products for 2 meals and 2 snacks per day.  5. Reviewed food journal and developed meal plan that fits with pt's schedule.    Goals Established By Patient:  1) To lose 10 lbs in the next month.    2)  To be consistent with tracking intake via food journal.   3) Increase exercise, start off with 2 days/week.    4) Increase water intake, aim for 64 oz/day.      Handouts Provided:  Non Surgical Weight Loss Packet      Monitoring and Evaluation:    Follow up with Health  weekly and RD monthly.     Ericka Lucero, RD

## 2021-06-14 NOTE — PROGRESS NOTES
Assessment:     1. Acute non-recurrent maxillary sinusitis  amoxicillin-clavulanate (AUGMENTIN) 875-125 mg per tablet   2. Bronchitis            Plan:     -Patient to take medications as prescribed.  She is to monitor for worsening signs.  She may return to the clinic if she develops a fever shortness of breath.      Subjective:       32 y.o. female presents for evaluation of a cough and congestion for 1-1/2 weeks.  She reports that today she started wheezing.  She recently traveled to [place] and she reports that everyone in the house her  and her son sick also.  She has not taken any medications with ACE illness.  She denies nausea, vomiting, fever, or diarrhea.  She reports sinus congestion has been for 1 and half weeks also.      The following portions of the patient's history were reviewed and updated as appropriate: allergies, current medications, past family history, past medical history, past social history, past surgical history and problem list.    Review of Systems  A 12 point comprehensive review of systems was negative except as noted.     Objective:      /68 (Patient Site: Right Arm)  Pulse 85  Temp 98.2  F (36.8  C) (Oral)   Wt (!) 242 lb 9 oz (110 kg)  SpO2 96%  BMI 44.37 kg/m2  General appearance: alert, appears stated age, cooperative and no distress  Head: Normocephalic, without obvious abnormality, atraumatic  Ears: abnormal TM right ear - bulging and air-fluid level and abnormal TM left ear - bulging and air-fluid level  Nose: +bilateral maxillary sinus tenderness to palpation.  Throat: lips, mucosa, and tongue normal; teeth and gums normal  Lungs: clear to auscultation bilaterally  Heart: regular rate and rhythm, S1, S2 normal, no murmur, click, rub or gallop  Skin: Skin color, texture, turgor normal. No rashes or lesions  Lymph nodes: Cervical, supraclavicular, and axillary nodes normal.  Neurologic: Grossly normal     This note has been dictated using voice recognition  software. Any grammatical or context distortions are unintentional and inherent to the software

## 2021-06-14 NOTE — PROGRESS NOTES
SUBJECTIVE: Jayda Ty is a 32 y.o. female with:  Chief Complaint   Patient presents with     Chest Pain     pt states still having chest tightness requesting inhaler rx      Wheezing     pt states still wheezing     She developed a sore throat then nasal congestion and next a cough.  Symptoms going on for 1-2 weeks.  She felt feverish initially.  She was seen in walk-in clinic and started on Augmentin 4 days ago.  She was also given benzonatate but it has not been helping.  She feels achy and chilled in evenings.  Her cough is no better and it is keeping her from sleeping.  Her cough is productive.  Her sputum is yellow.  No shortness of breath but she has heard wheezing at times.  Her chest is feeling tight.  She did use an inhaler once when she had pneumonia a few years ago.  No history of smoking / asthma.  She was traveling during this time and did not get to rest.  She is back at work and did not get a lot of sleep last night.    SH: .  Works as nurse at [employer].    Patient Active Problem List   Diagnosis     Polycystic Ovarian Syndrome     Murmurs     Gallbladder Polyp     Morbid obesity with BMI of 40.0-44.9, adult        OBJECTIVE: /78 (Patient Site: Left Arm, Patient Position: Sitting, Cuff Size: Adult Large)  Pulse 77  Temp 98.1  F (36.7  C) (Oral)   Resp 16  Wt (!) 239 lb 12 oz (108.7 kg)  SpO2 96%  Breastfeeding? No  BMI 43.85 kg/m2   No acute distress.  HEENT: Head is atraumatic and/normocephalic.  PERRL.  Conjunctiva clear.  Tympanic membranes grey with normal landmarks and normal light reflexes.  No nasal discharge.  Oropharynx is pink and moist.    Neck: Supple.  No lymphadenopathy or thyromegaly.  Lungs: Clear to auscultation.  No wheezing, retractions, or tachypnea.  Heart: RRR. S1 and S2 normal.  No murmurs, rubs, or gallops.  Neuro: Awake, alert, oriented x 3.  Normal strength and tone.  Normal gait.     Jayda was seen today for chest pain and  wheezing.    Diagnoses and all orders for this visit:    Cough - I think she may have some bronchospasm so will start albuterol inhaler.  If not improving consider a short course of prednisone.  She can use cough syrup with codeine for sleep.  -     albuterol (PROAIR HFA;PROVENTIL HFA;VENTOLIN HFA) 90 mcg/actuation inhaler; Inhale 2 puffs every 6 (six) hours as needed for wheezing.  -     codeine-guaiFENesin (GUAIFENESIN AC)  mg/5 mL liquid; Take 5-10 mL by mouth every 6 (six) hours as needed for cough.    Sinusitis- Finish course of antibiotics.  Take probiotics in between.    Iris Cook

## 2021-06-14 NOTE — TELEPHONE ENCOUNTER
Call attempt x2 for health and wellness coaching appt as part of the 24-week Healthy Lifestyle Plan.    Sent email and Exhbithart message to offer assistance in getting the appt rescheduled.    May call the main scheduling line as well:  [phone].    QASIM Garcia, Counts include 234 beds at the [facility]  National Board Certified Health &   24 Week Healthy Lifestyle Program Health   [facility] [facility]  [phone]  email:  [email or web address]

## 2021-06-14 NOTE — PROGRESS NOTES
SAMRA MARTINES Reason for Visit: 24-Week Healthy Lifestyle Program Follow up Visit - Health Coaching  Referred by: Juan Patterson  Progress Notes:  Return 24-Week Healthy Lifestyle Program Weight Management Health Coaching Note  Jayda Ty   MRN: [id number]  : 1985  NEPTALI: 2021  Health  Visit #: 2  Telephone Visit: yes  Start Date:  12/15/2020  Graduation Date:  6/15/2021  Physician:  Dr. Toney    ASSESSMENT:  Initial Weight: 230 lbs (?) based off of Dr. Toney's visit on 12/15 with a BMI of 42.07 kg/m2   Current Weight (lbs): 230 lbs (?)  Average Daily Water (ounces): ask at visit #3  Weight Loss Medication: Naltrexone  Nutrition Plan: Real whole foods    Level of Satisfaction:   Rate your current level of satisfaction on a scale of 1-10 in each pillar.      Nutrition (1 -10): knowledge:  4; implementation:  4  time with RD seemed quick, was asking for sample meal days, received a list of foods 4 for implentation    Movement/Activity (1 -10): intention is high = 8 (yoga mat, hand weights, ice skates, hiking shoes so can walk outside, new ear buds, [employer] membership) want to look forward to it. Wants to be able to choose.    Sleep (1 -10): 3 (back in uncomfortable while sleeping, wakes up by 1am or 2am with anxious thoughts sometimes, dog wants to get up at 4:30 am) (9:30/10pm is typical bedtime routine)    Stress Management (1 -10): 6 (learning more about what I need as a person to help cope with life and stressors, learning about limits, do need to take some time for self)    PILLAR(S) DISCUSSED IN THIS VISIT:  Sleep: see above  Exercise/Movement: see above  Nutrition: got results from a recent food sensitivity test so feeling good about having more information to make choices moving forward.  Intuitive Eating Podcast - listened to recently.  Freeing self from being so hard on self. Give body what it needs in the moment vs spiraling...  Friendly relationship with food.  Body can be  trusted and body is your home.  Neutral with body and ok to feel frustrated. VS good and bad.  Other: going to work everyday does cause a lot of anxious feelings.  One part of job is really causing her stress and doesn't want to let anyone down.  Advocate for self and letting go of any feelings.    GOALS:  Nutrition: routine of eating healthier, grocery lists, lunches with to work  Questions for Ericka before the next visit.    NOTES:   to Federico and son Bishnu, who is 5yrs.  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  PCOS which impacts for fertility.  Wanting to add to her family and having trouble getting pregnant.  Emotional eats her feelings.  Weighs 40 lbs more now that she was she was pregnant with Bishnu.  Wants to be healthy for her family.  Cooped up and lonely with Covid-19.  Been in a funk.  Feeling guilt everyday.  Stuck.  Behavior and needs to be mindful of.    FOLLOW-Up:   with Ericka for RD follow-up, 2/3 at 8:00 am for HC #3 (happy/healthy/vision)    Reminder:  Monthly Support Groups offered virtually via TEAMS.  Contact Naomie Denton ([email or web address]) to be added to the invite list.  January thru May 2021, 12:30 pm-1:30 pm each month:  :  How to Stay Active and Healthy During the Winter Months  :  Reading Food Labels:  What Do I Need to Know? (Kaylan Pena, HIGINIO)  :  Finding Health, Happiness and Confidence at Every Size  :  Eating Healthy on a Budget (Jennifer Infante RD)  May 21:  Open Forum     SIGNATURE:  QASIM Garcia, [facility]  National Board Certified Health &   24 Week Healthy Lifestyle Program Health   [place] [facility]  email:  [email or web address]  appointment [phone]

## 2021-06-15 NOTE — PROGRESS NOTES
SAMRA MARTINES Reason for Visit: 24-Week Healthy Lifestyle Program Follow up Visit - Health Coaching  Referred by: Juan Patterson  Progress Notes:  Return 24-Week Healthy Lifestyle Program Weight Management Health Coaching Note  Jayda Ty   MRN: [id number]  : 1985  NEPTALI: 2021  Health  Visit #: 3  Telephone Visit: yes  Start Date:  12/15/2020  Graduation Date:  6/15/2021  Physician:  Dr. Toney    ASSESSMENT:  Initial Weight: 230 lbs (?) based off of Dr. Toney's visit on 12/15 with a BMI of 42.07 kg/m2   Current Weight (lbs):  247 lbs. weighing 1x/week  Average Daily Water (ounces): 70-80 lbs.  Weight Loss Medication: Naltrexone, started Metformin and wondering about increase the dosage  Nutrition Plan: Real whole foods    PREVIOUS GOAL(S) REVIEW:  Nutrition: routine of eating healthier, grocery lists, lunches with to work  Check in:  Been eating more at home and at work, allowing self to have one meal in the cafe (build your own salads)  Questions for Ericka before the next visit.    PILLAR(S) DISCUSSED IN THIS VISIT:  Sleep: slept thru the night last night and feels great today, no back pain today!  Exercise/Movement: has not joined mom at the [facility] walking, due to feeling embarrassed.    Nutrition: wondering about what to do for her birthday [birth year]. 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 Toney message re:  Increase in Metformin    NOTES:     to Federico and son Bishnu, who is 5yrs.  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  PCOS which impacts for fertility.  Wanting to add to her family and having trouble getting pregnant.  Emotional eats her feelings.  Weighs 40 lbs more now that she was she was pregnant with Bishnu.  Wants to be healthy for her family.  Cooped up and lonely with Covid-19.  Been in a funk.  Feeling guilt everyday.  Stuck.  Behavior and needs to be mindful of.    FOLLOW-Up:      Reminder:  Monthly Support Groups offered virtually via TEAMS.  Contact Naomie Denton ([email or web address]) to be added to the invite list.  thru May 2021, 12:30 pm-1:30 pm each month:  :  Reading Food Labels:  What Do I Need to Know? (Kaylan Pena, HIGINIO)  :  Finding Health, Happiness and Confidence at Every Size  :  Eating Healthy on a Budget (Jennifer Infante RD)  May 21:  Open Forum     SIGNATURE:  QASIM Garcia, [facility]  National Board Certified Health &   24 Week Healthy Lifestyle Program Health   [place] [facility]  email:  [email or web address]  appointment [phone]

## 2021-06-16 PROBLEM — Z87.74 H/O ATRIAL SEPTAL DEFECT REPAIR: Status: ACTIVE | Noted: 2019-09-09

## 2021-06-16 PROBLEM — Q62.11 HYDRONEPHROSIS WITH URETEROPELVIC JUNCTION (UPJ) OBSTRUCTION: Status: ACTIVE | Noted: 2019-09-09

## 2021-06-16 PROBLEM — N20.0 NEPHROLITHIASIS: Status: ACTIVE | Noted: 2019-09-09

## 2021-06-16 PROBLEM — D72.825 BANDEMIA: Status: ACTIVE | Noted: 2019-09-09

## 2021-06-16 PROBLEM — N20.1 RIGHT URETERAL STONE: Status: ACTIVE | Noted: 2019-09-09

## 2021-06-17 NOTE — TELEPHONE ENCOUNTER
Email sent offering assistance in getting patient scheduled for a health & wellness coaching appt as part of the 24-week Healthy Lifestyle Program.  Program dates:  2020 thru 2021.    QASIM Garcia, [facility]  National Board Certified Health &   24 Week Healthy Lifestyle Program Health   [place] [facility]  [phone]  email:  [email or web address]

## 2021-06-20 ENCOUNTER — HEALTH MAINTENANCE LETTER (OUTPATIENT)
Age: 36
End: 2021-06-20

## 2021-06-20 NOTE — PROGRESS NOTES
ASSESSMENT & PLAN    No problem-specific Assessment & Plan notes found for this encounter.      Jayda was seen today for annual exam and medication refill.    Diagnoses and all orders for this visit:    Polycystic Ovarian Syndrome  -     Insulin Assay  -     Ambulatory referral to Nutrition Services    Morbid obesity with BMI of 40.0-44.9, adult (H)  -     Ambulatory referral to Nutrition Services    Well adult exam  -     Dehydroepiandrosterone Sulfate, Serum (DHEAS)  -     Comprehensive Metabolic Panel  -     Vitamin D, Total (25-Hydroxy)  -     Vitamin B12  -     Thyroid Peroxidase Antibody  -     T4, Free  -     T3, Total  -     Thyroid Stimulating Hormone (TSH)  -     LipoFit by NMR  -     HM1(CBC and Differential)  -     Iron and Transferrin Iron Binding Capacity  -     Insulin Assay  -     HM1 (CBC with Diff)  -     Urinalysis-UC if Indicated    Adjustment reaction with mixed disturbance of emotions and conduct  -     Dehydroepiandrosterone Sulfate, Serum (DHEAS)  -     Thyroid Peroxidase Antibody  -     T4, Free  -     T3, Total  -     Thyroid Stimulating Hormone (TSH)  -     Ambulatory referral to Psychology    Acanthosis nigricans  -     Ambulatory referral to Nutrition Services    Lentigo  -     Ambulatory referral to Dermatology      Return in about 6 months (around 3/4/2019).            CHIEF COMPLAINT: Jayda Ty had concerns including Annual Exam and Medication Refill.    [place]: 1.............. had concerns including Annual Exam and Medication Refill.    1. Polycystic Ovarian Syndrome    2. Morbid obesity with BMI of 40.0-44.9, adult (H)    3. Well adult exam    4. Adjustment reaction with mixed disturbance of emotions and conduct    5. Acanthosis nigricans    6. Lentigo          CC:              Annual exam.    Going through fertility right now  Some symptoms of anxiety and depression not suicidal  Interested in seeing a nutritionist regarding high nutrient functional foods  No fracture on her  lower lip has noticed it but does not have it evaluated  Fasting today  Family history of colon cancer mom in her 60s talked about screening in her 40s  Up-to-date on cervical cancer screening    Any other Problems in order of Priority:        SUBJECTIVE:  Jayda Ty is a 33 y.o. female    Past Medical History:   Diagnosis Date     Aortic septal defect     repaired     Arrest of descent, delivered, current hospitalization 3/7/2015     PCOS (polycystic ovarian syndrome)      Pregnant 3/2/2015     Past Surgical History:   Procedure Laterality Date     ASD REPAIR        SECTION, LOW TRANSVERSE  3/3/2015     LAPAROSCOPIC CHOLECYSTECTOMY N/A 2015    Procedure: CHOLECYSTECTOMY LAPAROSCOPIC;  Surgeon: Michael Malloy MD;  Location: [facility];  Service:      tonsils      at 11 years old     WISDOM TOOTH EXTRACTION      8th grade per patient     Review of patient's allergies indicates no known allergies.  Current Outpatient Prescriptions   Medication Sig Dispense Refill     clomiPHENE (CLOMID) 50 mg tablet Take 150 mg by mouth daily.       ibuprofen (ADVIL,MOTRIN) 200 MG tablet Take 600 mg by mouth every 6 (six) hours as needed for pain.       metFORMIN (GLUCOPHAGE-XR) 500 MG 24 hr tablet Take one tab daily for one week, then increase to 2 tabs daily 60 tablet 6     phentermine (ADIPEX-P) 37.5 mg tablet Take 0.5 tablets (18.75 mg total) by mouth 2 (two) times a day. Take at 10 am and 3 pm 30 tablet 0     No current facility-administered medications for this visit.      Family History   Problem Relation Age of Onset     Hypertension Mother      Heart disease Mother 60     avr  2014  cabg x 1     Hypothyroidism Mother      Colon cancer Maternal Grandmother 50     Anesthesia problems Neg Hx      Social History     Social History     Marital status:      Spouse name: N/A     Number of children: N/A     Years of education: N/A     Social History Main Topics     Smoking status: Never  Smoker     Smokeless tobacco: Never Used     Alcohol use No     Drug use: No     Sexual activity: Yes     Partners: Male     Birth control/ protection: OCP      Comment:  2013     Other Topics Concern     None     Social History Narrative     Patient Active Problem List   Diagnosis     Polycystic Ovarian Syndrome     Murmurs     Gallbladder Polyp     Morbid obesity with BMI of 40.0-44.9, adult (H)                                              SOCIAL: She  reports that she has never smoked. She has never used smokeless tobacco. She reports that she does not drink alcohol or use illicit drugs.    REVIEW OF SYSTEMS:   Family history not pertinent to chief complaint or presenting problem    Review of Systems:     Nervous System: No headache, dizziness, fainting or memory loss. No tingling sensation of hand or feet.  Ears: No hearing loss or ringing in the ears  Eyes: No blurring of vision, redness, itching or dryness.   Eye at night difficult  In the distant   Nose: No nosebleed or loss of smell  Mouth: No mouth sores or loss of taste  Throat: No hoarseness or difficulty swallowing  Neck: No enlarged thyroid or lymph nodes.  Heart: No chest pain, palpitation or irregular heartbeat. No swelling of hands or feet  Lungs: No shortness of breath, cough, night sweats, wheezing or hemoptysis.  Gastrointestinal: No nausea or vomiting, constipation or diarrhea. No acid reflux, abdominal pain or blood in stools.  Kidney/Bladdr: No polyuria, polydipsia, nocturia or hematuria.  Genital/Sexual: No loss of libido No Sex function Changes  Skin: No rash, hair loss or ++hirsutis Dark Hair Arms   Muscles/Joints/Bones: No morning stiffness, muscle aches and pain or loss of height.      Review of systems otherwise negative as requested from patient, except   Those positive ROS outlined and discussed in [place].    OBJECTIVE:  /70 (Patient Site: Left Arm, Patient Position: Sitting, Cuff Size: Adult Large)  Pulse 81  Resp 16  Wt  (!) 241 lb 8 oz (109.5 kg)  LMP 06/28/2018  SpO2 98%  Breastfeeding? No  BMI 44.17 kg/m2    GENERAL:     No acute distress.   Alert and oriented X 3         Physical:    Sclera clear  Tympanic TMs are clear  Oropharynx is clear excellent dentition  His mucosa is slightly irritated but no nasal polyps  A cantholysis nigra cans of the neck  Lungs are clear  Cardiac S1-S2 regular soft appreciable murmur gallop  5 mm darkly pigmented lesion left lower lip  Abdomen soft obese nontender  Lower extremities without edema        ASSESSMENT & PLAN      Jayda was seen today for annual exam and medication refill.    Diagnoses and all orders for this visit:    Polycystic Ovarian Syndrome  -     Insulin Assay  -     Ambulatory referral to Nutrition Services    Morbid obesity with BMI of 40.0-44.9, adult (H)  -     Ambulatory referral to Nutrition Services    Well adult exam  -     Dehydroepiandrosterone Sulfate, Serum (DHEAS)  -     Comprehensive Metabolic Panel  -     Vitamin D, Total (25-Hydroxy)  -     Vitamin B12  -     Thyroid Peroxidase Antibody  -     T4, Free  -     T3, Total  -     Thyroid Stimulating Hormone (TSH)  -     LipoFit by NMR  -     HM1(CBC and Differential)  -     Iron and Transferrin Iron Binding Capacity  -     Insulin Assay  -     HM1 (CBC with Diff)  -     Urinalysis-UC if Indicated    Adjustment reaction with mixed disturbance of emotions and conduct  -     Dehydroepiandrosterone Sulfate, Serum (DHEAS)  -     Thyroid Peroxidase Antibody  -     T4, Free  -     T3, Total  -     Thyroid Stimulating Hormone (TSH)  -     Ambulatory referral to Psychology    Acanthosis nigricans  -     Ambulatory referral to Nutrition Services    Lentigo  -     Ambulatory referral to Dermatology      Return in about 6 months (around 3/4/2019).       Anticipatory Guidance and Symptomatic Cares Discussed   Advised to call back directly if there are further questions, or if these symptoms fail to improve as anticipated or  worsen.  Return to clinic if patient has a clinical concern that warrants an exam.         I spent 30  minutes with this patient face to face, of which 50% or greater was spent in counseling and coordination of care with regards to Jayda was seen today for annual exam and medication refill.    Diagnoses and all orders for this visit:    Polycystic Ovarian Syndrome  -     Insulin Assay  -     Ambulatory referral to Nutrition Services    Morbid obesity with BMI of 40.0-44.9, adult (H)  -     Ambulatory referral to Nutrition Services    Well adult exam  -     Dehydroepiandrosterone Sulfate, Serum (DHEAS)  -     Comprehensive Metabolic Panel  -     Vitamin D, Total (25-Hydroxy)  -     Vitamin B12  -     Thyroid Peroxidase Antibody  -     T4, Free  -     T3, Total  -     Thyroid Stimulating Hormone (TSH)  -     LipoFit by NMR  -     HM1(CBC and Differential)  -     Iron and Transferrin Iron Binding Capacity  -     Insulin Assay  -     HM1 (CBC with Diff)  -     Urinalysis-UC if Indicated    Adjustment reaction with mixed disturbance of emotions and conduct  -     Dehydroepiandrosterone Sulfate, Serum (DHEAS)  -     Thyroid Peroxidase Antibody  -     T4, Free  -     T3, Total  -     Thyroid Stimulating Hormone (TSH)  -     Ambulatory referral to Psychology    Acanthosis nigricans  -     Ambulatory referral to Nutrition Services    Lentigo  -     Ambulatory referral to Dermatology      Juan Patterson MD  Family Medicine   [facility] [address] [phone]

## 2021-10-10 ENCOUNTER — HEALTH MAINTENANCE LETTER (OUTPATIENT)
Age: 36
End: 2021-10-10

## 2022-03-01 ENCOUNTER — OFFICE VISIT (OUTPATIENT)
Dept: FAMILY MEDICINE | Facility: CLINIC | Age: 37
End: 2022-03-01
Payer: COMMERCIAL

## 2022-03-01 VITALS
TEMPERATURE: 98.9 F | OXYGEN SATURATION: 99 % | HEART RATE: 69 BPM | DIASTOLIC BLOOD PRESSURE: 60 MMHG | SYSTOLIC BLOOD PRESSURE: 102 MMHG

## 2022-03-01 DIAGNOSIS — Z13.21 ENCOUNTER FOR VITAMIN DEFICIENCY SCREENING: ICD-10-CM

## 2022-03-01 DIAGNOSIS — D48.5 NEOPLASM OF UNCERTAIN BEHAVIOR OF SKIN: ICD-10-CM

## 2022-03-01 DIAGNOSIS — N20.1 RIGHT URETERAL STONE: ICD-10-CM

## 2022-03-01 DIAGNOSIS — R40.0 DAYTIME SLEEPINESS: ICD-10-CM

## 2022-03-01 DIAGNOSIS — Z87.74 H/O ATRIAL SEPTAL DEFECT REPAIR: ICD-10-CM

## 2022-03-01 DIAGNOSIS — Z00.00 ROUTINE GENERAL MEDICAL EXAMINATION AT A HEALTH CARE FACILITY: Primary | ICD-10-CM

## 2022-03-01 DIAGNOSIS — L81.4 LENTIGO: ICD-10-CM

## 2022-03-01 DIAGNOSIS — R06.83 SNORING: ICD-10-CM

## 2022-03-01 DIAGNOSIS — E66.01 MORBID OBESITY WITH BMI OF 40.0-44.9, ADULT (H): ICD-10-CM

## 2022-03-01 PROBLEM — E16.1 HYPERINSULINEMIA: Status: ACTIVE | Noted: 2022-03-01

## 2022-03-01 PROBLEM — R73.03 PREDIABETES: Status: ACTIVE | Noted: 2022-03-01

## 2022-03-01 LAB
ALBUMIN SERPL-MCNC: 3.8 G/DL (ref 3.5–5)
ALP SERPL-CCNC: 120 U/L (ref 45–120)
ALT SERPL W P-5'-P-CCNC: 53 U/L (ref 0–45)
ANION GAP SERPL CALCULATED.3IONS-SCNC: 11 MMOL/L (ref 5–18)
AST SERPL W P-5'-P-CCNC: 37 U/L (ref 0–40)
BILIRUB SERPL-MCNC: 0.6 MG/DL (ref 0–1)
BUN SERPL-MCNC: 10 MG/DL (ref 8–22)
C REACTIVE PROTEIN LHE: 0.8 MG/DL (ref 0–0.8)
CALCIUM SERPL-MCNC: 9.4 MG/DL (ref 8.5–10.5)
CHLORIDE BLD-SCNC: 105 MMOL/L (ref 98–107)
CO2 SERPL-SCNC: 22 MMOL/L (ref 22–31)
CREAT SERPL-MCNC: 0.7 MG/DL (ref 0.6–1.1)
ERYTHROCYTE [DISTWIDTH] IN BLOOD BY AUTOMATED COUNT: 14 % (ref 10–15)
GFR SERPL CREATININE-BSD FRML MDRD: >90 ML/MIN/1.73M2
GLUCOSE BLD-MCNC: 92 MG/DL (ref 70–125)
HBA1C MFR BLD: 5.8 % (ref 0–5.6)
HCT VFR BLD AUTO: 44.1 % (ref 35–47)
HGB BLD-MCNC: 14.4 G/DL (ref 11.7–15.7)
INSULIN SERPL-ACNC: 25.8 MU/L (ref 3–25)
MCH RBC QN AUTO: 29.1 PG (ref 26.5–33)
MCHC RBC AUTO-ENTMCNC: 32.7 G/DL (ref 31.5–36.5)
MCV RBC AUTO: 89 FL (ref 78–100)
PLATELET # BLD AUTO: 328 10E3/UL (ref 150–450)
POTASSIUM BLD-SCNC: 4.1 MMOL/L (ref 3.5–5)
PROT SERPL-MCNC: 7.2 G/DL (ref 6–8)
RBC # BLD AUTO: 4.95 10E6/UL (ref 3.8–5.2)
SODIUM SERPL-SCNC: 138 MMOL/L (ref 136–145)
T3 SERPL-MCNC: 126 NG/DL (ref 60–181)
THYROPEROXIDASE AB SERPL-ACNC: <3 IU/ML (ref 0–6)
TSH SERPL DL<=0.005 MIU/L-ACNC: 2.93 UIU/ML (ref 0.3–5)
WBC # BLD AUTO: 10.2 10E3/UL (ref 4–11)

## 2022-03-01 PROCEDURE — 84443 ASSAY THYROID STIM HORMONE: CPT | Performed by: FAMILY MEDICINE

## 2022-03-01 PROCEDURE — 80061 LIPID PANEL: CPT | Performed by: FAMILY MEDICINE

## 2022-03-01 PROCEDURE — 80053 COMPREHEN METABOLIC PANEL: CPT | Performed by: FAMILY MEDICINE

## 2022-03-01 PROCEDURE — 83525 ASSAY OF INSULIN: CPT | Performed by: FAMILY MEDICINE

## 2022-03-01 PROCEDURE — 36415 COLL VENOUS BLD VENIPUNCTURE: CPT | Performed by: FAMILY MEDICINE

## 2022-03-01 PROCEDURE — 82306 VITAMIN D 25 HYDROXY: CPT | Performed by: FAMILY MEDICINE

## 2022-03-01 PROCEDURE — 84480 ASSAY TRIIODOTHYRONINE (T3): CPT | Performed by: FAMILY MEDICINE

## 2022-03-01 PROCEDURE — 86140 C-REACTIVE PROTEIN: CPT | Performed by: FAMILY MEDICINE

## 2022-03-01 PROCEDURE — 83036 HEMOGLOBIN GLYCOSYLATED A1C: CPT | Performed by: FAMILY MEDICINE

## 2022-03-01 PROCEDURE — 86376 MICROSOMAL ANTIBODY EACH: CPT | Performed by: FAMILY MEDICINE

## 2022-03-01 PROCEDURE — 85027 COMPLETE CBC AUTOMATED: CPT | Performed by: FAMILY MEDICINE

## 2022-03-01 PROCEDURE — 99395 PREV VISIT EST AGE 18-39: CPT | Performed by: FAMILY MEDICINE

## 2022-03-01 NOTE — PATIENT INSTRUCTIONS
Thanks for coming in [place]    We will do blood work today    We would like you to hold your Pap smear from      Lets have you accomplish a sleep test; without restorative sleep you are always going to be behind eightball from the metabolism standpoint    Medications to consider from a weight reduction improvement of metabolism standpoint  GLP-1 agonists such as Victoza/Saxenda  Adderall    Talk with Luis Alberto Kate our MTM pharmacist    I would like to dose you up on 1 of these medications as this will likely feedback on your insulin levels as well as to help you rebalance your weight    Focus on high-protein, higher good fats and lower carbohydrate foods    Continue with your orange theory workouts    Remember this is a 4-year process to get you to goal for your weight rebalancing      See dermatology and have them look at the lesion on your lip and your back as well as a full body skin exam  DanDENIS  Preventive Health Recommendations  Female Ages 26 - 39  Yearly exam:   See your health care provider every year in order to    Review health changes.     Discuss preventive care.      Review your medicines if you your doctor has prescribed any.    Until age 30: Get a Pap test every three years (more often if you have had an abnormal result).    After age 30: Talk to your doctor about whether you should have a Pap test every 3 years or have a Pap test with HPV screening every 5 years.   You do not need a Pap test if your uterus was removed (hysterectomy) and you have not had cancer.  You should be tested each year for STDs (sexually transmitted diseases), if you're at risk.   Talk to your provider about how often to have your cholesterol checked.  If you are at risk for diabetes, you should have a diabetes test (fasting glucose).  Shots: Get a flu shot each year. Get a tetanus shot every 10 years.   Nutrition:     Eat at least 5 servings of fruits and vegetables each day.    Eat whole-grain bread, whole-wheat  pasta and brown rice instead of white grains and rice.    Get adequate Calcium and Vitamin D.     Lifestyle    Exercise at least 150 minutes a week (30 minutes a day, 5 days of the week). This will help you control your weight and prevent disease.    Limit alcohol to one drink per day.    No smoking.     Wear sunscreen to prevent skin cancer.    See your dentist every six months for an exam and cleaning.

## 2022-03-01 NOTE — PROGRESS NOTES
SUBJECTIVE:   CC: Jayda Ty is an 37 year old woman who presents for preventive health visit.       Patient has been advised of split billing requirements and indicates understanding: Yes  Healthy Habits:     Getting at least 3 servings of Calcium per day:  Yes    Bi-annual eye exam:  NO    Dental care twice a year:  Yes    Sleep apnea or symptoms of sleep apnea:  Excessive snoring    Diet:  Regular (no restrictions)    Frequency of exercise:  2-3 days/week    Duration of exercise:  30-45 minutes    Taking medications regularly:  Yes    PHQ-2 Total Score: 2    Additional concerns today:  No    Just saw OB/GYN last week [facility] (was not due for a pap)   Last pap 5?/2020      Today's PHQ-2 Score:   PHQ-2 ( 1999 [employer]) 2/26/2022   Q1: Little interest or pleasure in doing things 1   Q2: Feeling down, depressed or hopeless 1   PHQ-2 Score 2   Q1: Little interest or pleasure in doing things Several days   Q2: Feeling down, depressed or hopeless Several days   PHQ-2 Score 2       Abuse: Current or Past (Physical, Sexual or Emotional) - No  Do you feel safe in your environment? Yes   Therapy [facility]     Have you ever done Advance Care Planning? (For example, a Health Directive, POLST, or a discussion with a medical provider or your loved ones about your wishes): No, advance care planning information given to patient to review.  Advanced care planning was discussed at today's visit.    Social History     Tobacco Use     Smoking status: Never Smoker     Smokeless tobacco: Never Used   Substance Use Topics     Alcohol use: No         Alcohol Use 2/26/2022   Prescreen: >3 drinks/day or >7 drinks/week? No       Reviewed orders with patient.  Reviewed health maintenance and updated orders accordingly - Yes  Lab work is in process    Breast Cancer Screening:    FHS-7:   Breast CA Risk Assessment (FHS-7) 2/26/2022   Did any of your first-degree relatives have breast or ovarian cancer? No   Did any  of your relatives have bilateral breast cancer? No   Did any man in your family have breast cancer? No   Did any woman in your family have breast and ovarian cancer? No   Did any woman in your family have breast cancer before age 50 y? No   Do you have 2 or more relatives with breast and/or ovarian cancer? No   Do you have 2 or more relatives with breast and/or bowel cancer? No     {If any of the questions to the BCRA (FHS-7) are answered yes, consider ordering referral for genetic counseling (Optional) :  Colon MGM   Pertinent mammograms are reviewed under the imaging tab.    History of abnormal Pap smear: Last 3 Pap Results: No results found for: PAP  PAP / HPV 7/28/2017   PAP Negative for squamous intraepithelial lesion or malignancy  Electronically signed by Bianca Geiger CT (ASCP) on 8/4/2017 at 12:02 PM       Reviewed and updated as needed this visit by clinical staff   Tobacco  Allergies  Meds   Med Hx  Surg Hx  Fam Hx  Soc Hx        Reviewed and updat lentigo lesion lower lip 2 mm  Ed as needed this visit by Provider                 Past Medical History:   Diagnosis Date     Aortic septal defect 2001    repaired     Arrest of descent, delivered, current hospitalization 3/7/2015     PCOS (polycystic ovarian syndrome)      Pregnant 3/2/2015      Past Surgical History:   Procedure Laterality Date     ASD REPAIR  2001     C/SECTION, LOW TRANSVERSE  3/3/2015     CARDIAC SURGERY  2001    ASD repair     LAPAROSCOPIC CHOLECYSTECTOMY N/A 9/18/2015    Procedure: CHOLECYSTECTOMY LAPAROSCOPIC;  Surgeon: Michael Malloy MD;  Location: [facility];  Service:      OTHER SURGICAL HISTORY      tonsilsat 11 years old     WISDOM TOOTH EXTRACTION      8th grade per patient       Review of Systems  Regualar 27 days   Bleeding 5 days (2 spot and 3 days)       Borden Theory  3 x         OBJECTIVE:   /60   Pulse 69   Temp 98.9  F (37.2  C) (Tympanic)   LMP 02/06/2022 (Exact Date)   SpO2 99%    Physical Exam      Physical:  General Appearance: Healthy-appearingy.   Head:  No deformity  Eyes: Sclerae white, pupils equal and reactive, extra ocular movements intact   Ears: Well-positioned, well-formed pinnae; TM pearly white, translucent, no bulging   Nose: Clear, normal mucosa no drainage or crusting  Throat: Lips, tongue, and mucosa are moist, pink and intact; tongue no thrush oral pharynx no injection or lesions  Neck: Supple, symmetric ROM no nodes   No carotid Buits  Chest: Lungs clear to auscultation, no retractions  Heart: Regular rate & rhythm, S1 S2, no murmur  Abdomen: Soft, non-tender, no masses; umbilical area normal   Pulses: Equal femoral pulses  : No hernia palpable   Extremities: Well-perfused, warm and dry, No Edema  Palpable Pulses Bilateral  Neuro: Easily aroused good tone NO tremor   Skin  No Rash nevus darkly pigmented mid back  Lentigo lesion 2 mm lower lip            ASSESSMENT/PLAN:   Jayda was seen today for physical.    Diagnoses and all orders for this visit:    Routine general medical examination at a health care facility    Snoring  -     Adult Sleep Eval & Management  Referral; Future    Daytime sleepiness  -     Adult Sleep Eval & Management  Referral; Future    Morbid obesity with BMI of 40.0-44.9, adult (H)  -     Insulin level; Future  -     Hemoglobin A1c; Future  -     Comprehensive metabolic panel (BMP + Alb, Alk Phos, ALT, AST, Total. Bili, TP); Future  -     CBC with platelets; Future  -     TSH with free T4 reflex; Future  -     Thyroid peroxidase antibody; Future  -     T3, total; Future  -     CRP, inflammation; Future    Right ureteral stone    H/O atrial septal defect repair    Encounter for vitamin deficiency screening  -     Vitamin D Deficiency; Future    Neoplasm of uncertain behavior of skin  -     Adult Dermatology Referral; Future    Lentigo    Other orders  -     Med Therapy Management Referral        Patient has been advised of split  "billing requirements and indicates understanding: No    COUNSELING:  Reviewed preventive health counseling, as reflected in patient instructions       Regular exercise       Healthy diet/nutrition    Estimated body mass index is 42.07 kg/m  as calculated from the following:    Height as of 12/15/20: 1.575 m (5' 2\").    Weight as of 9/13/19: 104.3 kg (230 lb).    Weight management plan: Discussed healthy diet and exercise guidelines    She reports that she has never smoked. She has never used smokeless tobacco.      Counseling Resources:  ATP IV Guidelines  Pooled Cohorts Equation Calculator  Breast Cancer Risk Calculator  BRCA-Related Cancer Risk Assessment: FHS-7 Tool  FRAX Risk Assessment  ICSI Preventive Guidelines  Dietary Guidelines for Americans, 2010  USDA's MyPlate  ASA Prophylaxis  Lung CA Screening    Juan Patterson MD  [facility]  "

## 2022-03-01 NOTE — Clinical Note
Please abstract the following data from this visit with this patient into the appropriate field in Epic:    Tests that can be patient reported without a hard copy:    Pap smear done on this date: Last pap 5?/2020 (approximately), by this group: [facility] , results were normal repeat in 3 years.     Nichelle Rodriguez MA

## 2022-03-02 LAB — DEPRECATED CALCIDIOL+CALCIFEROL SERPL-MC: 16 UG/L (ref 30–80)

## 2022-03-03 ENCOUNTER — VIRTUAL VISIT (OUTPATIENT)
Dept: PHARMACY | Facility: CLINIC | Age: 37
End: 2022-03-03
Attending: FAMILY MEDICINE
Payer: COMMERCIAL

## 2022-03-03 DIAGNOSIS — E66.01 MORBID OBESITY WITH BMI OF 40.0-44.9, ADULT (H): Primary | ICD-10-CM

## 2022-03-03 DIAGNOSIS — E55.9 VITAMIN D DEFICIENCY: ICD-10-CM

## 2022-03-03 LAB
CHOLEST SERPL-MCNC: 159 MG/DL
HDLC SERPL-MCNC: 38 MG/DL
LDLC SERPL CALC-MCNC: 95 MG/DL
NONHDLC SERPL-MCNC: 121 MG/DL
TRIGL SERPL-MCNC: 128 MG/DL

## 2022-03-03 PROCEDURE — 99607 MTMS BY PHARM ADDL 15 MIN: CPT | Performed by: PHARMACIST

## 2022-03-03 PROCEDURE — 99605 MTMS BY PHARM NP 15 MIN: CPT | Performed by: PHARMACIST

## 2022-03-03 RX ORDER — SEMAGLUTIDE 0.25 MG/.5ML
0.25 INJECTION, SOLUTION SUBCUTANEOUS WEEKLY
Qty: 2 ML | Refills: 0 | Status: SHIPPED | OUTPATIENT
Start: 2022-03-03 | End: 2022-03-24 | Stop reason: ALTCHOICE

## 2022-03-03 RX ORDER — ERGOCALCIFEROL 1.25 MG/1
50000 CAPSULE, LIQUID FILLED ORAL WEEKLY
Qty: 8 CAPSULE | Refills: 0 | Status: SHIPPED | OUTPATIENT
Start: 2022-03-03 | End: 2024-03-07

## 2022-03-03 NOTE — PROGRESS NOTES
Medication Therapy Management (MTM) Encounter    ASSESSMENT:                            Medication Adherence/Access: No issues identified    1. Morbid obesity with BMI of 40.0-44.9, adult (H)  Currently untreated.  Reviewed benefits versus risks of previous regimens that have been intolerant poorly effective, including Metformin, naltrexone, bupropion.  Reviewed benefits of GLP-1 agonist such as Wegovy or Saxenda marketed specifically for weight loss.  Provided education on mechanism of action, how to administer, and potential adverse effects.  After discussion recommend to initiate once weekly Wegovy.  We will investigate insurance coverage and provide resources for her to use a  coupon offline.  If tolerated recommend to try dose monthly to maximum dose of 2.4 mg weekly.  She demonstrates understanding of follow-up via [employer] about 3 weeks prior to next dose titration, or if there are any difficulties with insurance coverage.    2. Vitamin D deficiency  Currently untreated, long-term history of vitamin D deficiency.  Recommend to initiate ergocalciferol 50,000 units once weekly for 2 months, and reevaluate vitamin D level at that time.  Thereafter recommend continue maintenance dose of 2000 units once daily.    PLAN:                            1.  Initiate Wegovy weekly, titrating dose every month as tolerated and able to goal of 2.4 mg once weekly  2.  Ergocalciferol 50,000 units once weekly for 2 months, and then reevaluate vitamin D level    Follow-up: Return in about 3 weeks (around 3/24/2022) for Follow up, with me, using a [employer] eVisit.    SUBJECTIVE/OBJECTIVE:                          Jayda Ty is a 37 year old female called for an initial visit. She was referred to me from Dr. Patterson.      Reason for visit: Medication management initial    Allergies/ADRs: Reviewed in chart  Past Medical History: Reviewed in chart  Tobacco: She reports that she has never smoked. She has never used  "smokeless tobacco.  Alcohol: none    Medication Adherence/Access: no issues reported    Obesity: She has frequent multiple weight loss programs, and previously tried Metformin, naltrexone, bupropion.  Metformin resulted in significant GI upset that did not improve with dose adjustment.  And while on combination of bupropion and naltrexone she found that she had mood changes resulting in her crying \"all the time.\"  She is interested in learning about alternative options to help with the loss.  She does not believe she is pregnant at this time and has had difficulties with infertility in the past.  She mentions that she thinks about food frequently throughout the day, and medication decreased appetite and cravings/thinking about food would be helpful.    Vitamin D deficiency: Reviewed recent labs, with low vitamin D level.  She previously had taken a vitamin D supplement however has not been doing so.  She is willing to be replacement.    Today's Vitals:  BP Readings from Last 3 Encounters:   03/01/22 102/60   09/13/19 100/64   ----------------    I spent 23 minutes with this patient today. All changes were made via collaborative practice agreement with Juan Patterson MD. A copy of the visit note was provided to the patient's provider(s).    The patient was sent via [employer] a summary of these recommendations.     Luis Alberto Kate, PharmD, BCACP  Medication Management (MTM) Pharmacist  [facility]    Telemedicine Visit Details  Type of service:  Telephone visit  Start Time: 10:30AM  End Time: 10:53AM  Originating Location (patient location): Home  Distant Location (provider location):  [facility]     Medication Therapy Recommendations  Morbid obesity with BMI of 40.0-44.9, adult (H)    Rationale: Untreated condition - Needs additional medication therapy - Indication   Recommendation: Start Medication - Wegovy 0.25 MG/0.5ML Soaj   Status: Accepted per CPA         Vitamin " D deficiency    Rationale: Untreated condition - Needs additional medication therapy - Indication   Recommendation: Start Medication - ERGOCALCIFEROL   Status: Accepted per CPA

## 2022-03-03 NOTE — PATIENT INSTRUCTIONS
PLAN:                            1.  Initiate Wegovy weekly, titrating dose every month as tolerated and able to goal of 2.4 mg once weekly  2.  Ergocalciferol 50,000 units once weekly for 2 months, and then reevaluate vitamin D level    Follow-up: Return in about 3 weeks (around 3/24/2022) for Follow up, with me, using a [employer] eVisit.

## 2022-03-18 ENCOUNTER — TRANSFERRED RECORDS (OUTPATIENT)
Dept: HEALTH INFORMATION MANAGEMENT | Facility: CLINIC | Age: 37
End: 2022-03-18
Payer: COMMERCIAL

## 2022-03-31 ENCOUNTER — TELEPHONE (OUTPATIENT)
Dept: FAMILY MEDICINE | Facility: CLINIC | Age: 37
End: 2022-03-31
Payer: COMMERCIAL

## 2022-03-31 NOTE — TELEPHONE ENCOUNTER
Prior Authorization Request    1. Prior Authorization for the medication Semaglutide-Weight Management (WEGOVY) 0.5 MG/0.5ML SOAJ        Requesting Provider: Juan Patterson Pt name: Jayda Ty        Pt : 1985        Pt MRN: [id number]        Last Office Visit: 3/1/2022           Insurance: Payor: [employer]ONE / Plan: [employer] HMO / Product Type: PPO /         Insurance ID Number: [unfilled]        Prior Auth Contact Phone number:     BIN#:   SARAH#:

## 2022-04-04 NOTE — TELEPHONE ENCOUNTER
Central Prior Authorization Team   Phone: [phone]    PA Initiation    Medication: Semaglutide-Weight Management (WEGOVY) 0.5 MG/0.5ML SOAJ  Insurance Company: [employer] - Phone [phone] Fax [phone]  Pharmacy Filling the Rx: [id number] [address]  AT [facility]  Filling Pharmacy Phone: [phone]  Filling Pharmacy Fax:    Start Date: 4/4/2022

## 2022-04-06 NOTE — TELEPHONE ENCOUNTER
Prior Authorization Not Needed per Insurance    Medication: Semaglutide-Weight Management (WEGOVY) 0.5 MG/0.5ML SOAJ  Insurance Company: [employer] - Phone [phone] Fax [phone]  Expected CoPay:      [facility] Rx[id number] [address]  AT [facility]  Pharmacy Notified: Yes  Patient Notified: Yes        Refill too soon, patient just picked up Wegovy 0.5mg/0.5ml.     Called pharmacy, they have paid claim.  Said to disregard PA request.

## 2022-09-24 ENCOUNTER — HEALTH MAINTENANCE LETTER (OUTPATIENT)
Age: 37
End: 2022-09-24

## 2023-01-16 ENCOUNTER — APPOINTMENT (OUTPATIENT)
Dept: CT IMAGING | Facility: CLINIC | Age: 38
End: 2023-01-16
Attending: EMERGENCY MEDICINE
Payer: COMMERCIAL

## 2023-01-16 ENCOUNTER — HOSPITAL ENCOUNTER (EMERGENCY)
Facility: CLINIC | Age: 38
Discharge: HOME | End: 2023-01-16
Admitting: EMERGENCY MEDICINE
Payer: COMMERCIAL

## 2023-01-16 VITALS
HEART RATE: 75 BPM | HEIGHT: 62 IN | BODY MASS INDEX: 45.08 KG/M2 | TEMPERATURE: 97.5 F | OXYGEN SATURATION: 99 % | DIASTOLIC BLOOD PRESSURE: 59 MMHG | WEIGHT: 245 LBS | SYSTOLIC BLOOD PRESSURE: 115 MMHG | RESPIRATION RATE: 16 BRPM

## 2023-01-16 DIAGNOSIS — S09.90XA INJURY OF HEAD, INITIAL ENCOUNTER: ICD-10-CM
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
## 2023-01-16 PROCEDURE — 99284 EMERGENCY DEPT VISIT MOD MDM: CPT | Mod: 25

## 2023-01-16 PROCEDURE — 70450 CT HEAD/BRAIN W/O DYE: CPT

## 2023-01-16 PROCEDURE — 72125 CT NECK SPINE W/O DYE: CPT

## 2023-01-16 ASSESSMENT — ENCOUNTER SYMPTOMS
NECK STIFFNESS: 1
HEADACHES: 1
NAUSEA: 1
BACK PAIN: 0
PHOTOPHOBIA: 1
CONFUSION: 0
NECK PAIN: 0
NUMBNESS: 0
WEAKNESS: 0
VOMITING: 0

## 2023-01-16 ASSESSMENT — ACTIVITIES OF DAILY LIVING (ADL): ADLS_ACUITY_SCORE: 35

## 2023-01-16 NOTE — ED PROVIDER NOTES
EMERGENCY DEPARTMENT ENCOUNTER      NAME: Jayda Ty  AGE: 37 year old female  YOB: 1985  MRN: [id number]  EVALUATION DATE & TIME: 1/16/2023 10:25 AM    PCP: Juan Patterson    ED PROVIDER: Cheryle Gardner PA-C      Chief Complaint   Patient presents with     Fall     Head Injury         FINAL IMPRESSION:  1. Fall, initial encounter    2. Injury of head, initial encounter          ED COURSE & MEDICAL DECISION MAKING:    Pertinent Labs & Imaging studies reviewed. (See chart for details)    37 year old female presents to the Emergency Department for evaluation of head injury.    Physical exam is remarkable for a well-appearing female who is in no acute distress.  Heart and lung sounds are clear diffusely throughout.  No spinal tenderness or step-offs, strength is 5 out of 5 in the upper and lower extremities bilaterally.  Cranial nerves III through XII appear grossly intact.  No raccoon eyes or shannon sign noted.  Vital signs are remarkable for mild hypertension but otherwise stable and she is afebrile.    CT of the head is negative with no evidence of intracranial bleeding or skull fracture.  CT of the cervical spine is negative with no evidence of fracture or dislocation.    The patient declined any medication for pain while here.  I do not think any further emergent labs or imaging are indicated at this time.  The patient is hemodynamically stable here and does not have any neurologic deficits on exam.  She is not anticoagulated and therefore low risk for delayed bleeding.  She reports no other injuries or pain as a result of this fall.  Discussed symptoms that might indicate concussion, recommend management at home with Tylenol and ibuprofen.  Advised her to follow-up with her primary care provider for a recheck next week and return here for any new or worsening symptoms.  The patient is agreeable with this treatment plan and verbalized her understanding.    Medical Decision  Making    Supplemental history from: Documented in HPI, if applicable    External Record(s) Reviewed: Documented in HPI, if applicable.    Differential Diagnosis: See [facility] charting for differential considered.     I performed an independent interpretation of the: N/A    Discussed with radiology regarding test interpretation: N/A    Discussion of management with another provider: See chart documentation, if applicable    The following testing was considered but ultimately not selected: None and MRI Imaging: of the C spine due to tingling in bilateral hands; deferred as patient has no deficit on exam.    I considered prescription management with: N/A    The patient's care impacted: N/A    Consideration of Admission/Observation: No    Care significantly affected by Social Determinants of Health including: N/A    ED Course   10:30 AM Went to evaluate patient, she is at imaging.  11:14 AM Performed my initial history and physical exam. Discussed workup in the emergency department, management of symptoms, and likely disposition.   I discussed the plan for discharge with the patient or family and they are agreeable.. We discussed supportive cares at home and reasons for return to the ER including new or worsening symptoms - all questions and concerns addressed. Patient to be discharged by RN.    At the conclusion of the encounter I discussed the results of all of the tests and the disposition. The questions were answered. The patient or family acknowledged understanding and was agreeable with the care plan.     Voice recognition software was used in the creation of this note. Any grammatical or nonsensical errors are due to inherent errors with the software and are not the intention of the writer.     MEDICATIONS GIVEN IN THE EMERGENCY:  Medications - No data to display    NEW PRESCRIPTIONS STARTED AT TODAY'S ER VISIT  Discharge Medication List as of 1/16/2023 11:23 AM            "    =================================================================    HPI    Patient information was obtained from: Patient    Use of : N/A         Jayda Ty is a 37 year old female with PMH of aortic septal defect (repaired), PCOS who presents to the ED via walk-in for evaluation of a fall/head injury.     The patient was walking her dog this morning and slipped on the ice, falling back and striking her head on concrete.  She does not believe she lost consciousness and was able to get herself up and into the house.  Currently, she endorses a global headache which she rates at a 6 out of 10.  She also has some light sensitivity and nausea which has since resolved.  She endorses tingling in the bilateral hands and \"stiffness\" in the neck.  She denies any history of concussion or traumatic head injury, she is not anticoagulated.    She denies vomiting, confusion, loss of sensation or function in her arms or legs, back pain, or visual changes.      REVIEW OF SYSTEMS   Review of Systems   Eyes: Positive for photophobia. Negative for visual disturbance.   Gastrointestinal: Positive for nausea (Now resolved). Negative for vomiting.   Musculoskeletal: Positive for neck stiffness. Negative for back pain and neck pain.   Neurological: Positive for headaches. Negative for weakness and numbness.        Tingling in bilateral hands; no LOC     Psychiatric/Behavioral: Negative for confusion.       All other systems reviewed and are negative unless noted in HPI.      PAST MEDICAL HISTORY:  Past Medical History:   Diagnosis Date     Aortic septal defect 2001    repaired     Arrest of descent, delivered, current hospitalization 3/7/2015     Nephrolithiasis     Ca++ Oxalate      PCOS (polycystic ovarian syndrome)      Pregnant 3/2/2015       PAST SURGICAL HISTORY:  Past Surgical History:   Procedure Laterality Date     ASD REPAIR  2001     C/SECTION, LOW TRANSVERSE  3/3/2015     CARDIAC SURGERY  2001    ASD " "repair     LAPAROSCOPIC CHOLECYSTECTOMY N/A 9/18/2015    Procedure: CHOLECYSTECTOMY LAPAROSCOPIC;  Surgeon: Michael Malloy MD;  Location: [facility] OR;  Service:      OTHER SURGICAL HISTORY      tonsilsat 11 years old     WISDOM TOOTH EXTRACTION      8th grade per patient       CURRENT MEDICATIONS:    Semaglutide-Weight Management (WEGOVY) 0.5 MG/0.5ML SOAJ  Semaglutide-Weight Management (WEGOVY) 1 MG/0.5ML SOAJ  Semaglutide-Weight Management (WEGOVY) 1.7 MG/0.75ML SOAJ  Semaglutide-Weight Management (WEGOVY) 2.4 MG/0.75ML SOAJ  vitamin D2 (ERGOCALCIFEROL) 56697 units (1250 mcg) capsule        ALLERGIES:  No Known Allergies    FAMILY HISTORY:  Family History   Problem Relation Age of Onset     Hypertension Mother      Heart Disease Mother 60.00        avr  2014  cabg x 1     Hypothyroidism Mother      Coronary Artery Disease Mother      Hyperlipidemia Mother      Anxiety Disorder Mother      Thyroid Disease Mother      Obesity Mother      Colon Cancer Maternal Grandmother 50.00     Anesthesia Reaction No family hx of        SOCIAL HISTORY:   Social History     Socioeconomic History     Marital status:    Tobacco Use     Smoking status: Never     Smokeless tobacco: Never   Vaping Use     Vaping Use: Never used   Substance and Sexual Activity     Alcohol use: No     Drug use: No     Sexual activity: Yes     Partners: Male     Birth control/protection: None     Comment:  2013   Other Topics Concern     Parent/sibling w/ CABG, MI or angioplasty before 65F 55M? Yes     Comment: Aortic valve replacement with bipassx1- mother       VITALS:  Patient Vitals for the past 24 hrs:   BP Temp Temp src Pulse Resp SpO2 Height Weight   01/16/23 1130 115/59 -- -- -- 16 -- -- --   01/16/23 1127 -- -- -- 75 -- 99 % -- --   01/16/23 0957 (!) 140/78 97.5  F (36.4  C) Temporal 65 16 99 % 1.575 m (5' 2\") 111.1 kg (245 lb)       PHYSICAL EXAM    VITAL SIGNS: /59   Pulse 75   Temp 97.5  F (36.4  C) (Temporal)  " " Resp 16   Ht 1.575 m (5' 2\")   Wt 111.1 kg (245 lb)   SpO2 99%   BMI 44.81 kg/m    General Appearance: Alert, cooperative, normal speech and facial symmetry, appears stated age, the patient does not appear in distress  Head:  Normocephalic, without obvious abnormality, atraumatic; no raccoon eyes or battles sign  Eyes: Conjunctiva/corneas clear, EOM's intact, no nystagmus, PERRL  ENT:  Lips, mucosa, and tongue normal; teeth and gums normal, no pharyngeal inflammation, no dysphonia or difficulty swallowing, membranes are moist without pallor  Cardio:  Regular rate and rhythm, S1 and S2 normal, no murmur, rub    or gallop, 2+ pulses symmetric in all extremities  Pulm:  Clear to auscultation bilaterally, respirations unlabored with no accessory muscle use  Back: No midline tenderness or step-offs, no CVA tenderness  Extremities:  Extremities normal, there is no tenderness to palpation, atraumatic, no cyanosis or edema, full function and range of motion, pulses equal in all extremities, normal cap refill, no joint swelling; strength is 5/5 in the upper and lower extremities bilaterally  Skin: Skin is warm and dry, no rashes or wounds  Neuro: Patient is awake, alert, and responsive to voice. No gross motor weaknesses or sensory loss; moves all extremities. Cranial Nerves:  CN2: No funduscopic exam performed. CN3,4 & 6: Pupillary light response, lateral and vertical gaze normal.  No nystagmus.  CN7: No facial weakness, smile, facial symmetry intact. CN8: Intact to spoken voice. CN9&10: Gag reflex, uvula midline, palate rises with phonation. CN11: Shoulder shrug 5/5 intact bilaterally. CN12: Tongue midline and moves freely from side to side.      LAB:  All pertinent labs reviewed and interpreted.  Labs Ordered and Resulted from Time of ED Arrival to Time of ED Departure - No data to display    RADIOLOGY:  Reviewed all pertinent imaging. Please see official radiology report.  Cervical spine CT w/o contrast   Final " Result   IMPRESSION:   1.  No evidence of acute fracture or posttraumatic subluxation.      Head CT w/o contrast   Final Result   IMPRESSION: No acute intracranial abnormality.            Cheryle Gardner PA-C  Emergency Medicine  [facility] EMERGENCY ROOM  [address] [place] [id number] [phone]  Dept: [phone]       Cheryle Gardner PA-C  01/16/23 1132

## 2023-01-16 NOTE — ED TRIAGE NOTES
The patient presents to the ED with headache after slipping on ice and falling in her driveway this morning, approximately 1 hour prior to arrival. The patient did not lose consciousness. She does reports some neck stiffness but denies cervical spine tenderness with palpation. The patient reports mild nausea but denies vomiting. She reports bilateral tingling in her hands. Does not take blood thinning medications.

## 2023-01-16 NOTE — DISCHARGE INSTRUCTIONS
You were seen here today for evaluation of a head injury.  Your CT scans today are reassuring with no evidence of skull fracture, bleeding in your brain, or fracture/dislocation in your neck.    You may take Tylenol and ibuprofen for pain/fever, do not exceed 4000 mg of Tylenol per day or 3200 mg ofibuprofen per day.    Follow-up with your primary care provider for recheck next week.  Return here for any new or worsening symptoms including severe pain,  confusion, vomiting, difficulty moving your arms or legs, or any other symptoms that concern you.

## 2023-01-16 NOTE — Clinical Note
Jayda Ty was seen and treated in our emergency department on 1/16/2023.  She may return to work on 01/18/2023.       If you have any questions or concerns, please don't hesitate to call.      Cheryle Gardner PA-C

## 2023-05-08 ENCOUNTER — HEALTH MAINTENANCE LETTER (OUTPATIENT)
Age: 38
End: 2023-05-08

## 2023-08-05 ENCOUNTER — E-VISIT (OUTPATIENT)
Dept: URGENT CARE | Facility: CLINIC | Age: 38
End: 2023-08-05
Payer: COMMERCIAL

## 2023-08-05 DIAGNOSIS — H10.30 ACUTE BACTERIAL CONJUNCTIVITIS, UNSPECIFIED LATERALITY: Primary | ICD-10-CM

## 2023-08-05 PROCEDURE — 99207 PR NON-BILLABLE SERV PER CHARTING: CPT | Performed by: NURSE PRACTITIONER

## 2023-08-05 RX ORDER — POLYMYXIN B SULFATE AND TRIMETHOPRIM 1; 10000 MG/ML; [USP'U]/ML
SOLUTION OPHTHALMIC
Qty: 10 ML | Refills: 0 | Status: SHIPPED | OUTPATIENT
Start: 2023-08-05 | End: 2023-08-12

## 2023-08-05 NOTE — PATIENT INSTRUCTIONS
Thank you for choosing us for your care. I have placed an order for a prescription so that you can start treatment. View your full visit summary for details by clicking on the link below. Your pharmacist will able to address any questions you may have about the medication.     If you re not feeling better within 2-3 days, please schedule an appointment.  You can schedule an appointment right here in [facility], or call [phone]  If the visit is for the same symptoms as your eVisit, we ll refund the cost of your eVisit if seen within seven days.

## 2023-11-28 ENCOUNTER — OFFICE VISIT (OUTPATIENT)
Dept: FAMILY MEDICINE | Facility: CLINIC | Age: 38
End: 2023-11-28
Payer: COMMERCIAL

## 2023-11-28 VITALS
RESPIRATION RATE: 20 BRPM | TEMPERATURE: 98.3 F | DIASTOLIC BLOOD PRESSURE: 84 MMHG | HEART RATE: 74 BPM | OXYGEN SATURATION: 97 % | SYSTOLIC BLOOD PRESSURE: 131 MMHG

## 2023-11-28 DIAGNOSIS — R05.1 ACUTE COUGH: Primary | ICD-10-CM

## 2023-11-28 PROCEDURE — 99213 OFFICE O/P EST LOW 20 MIN: CPT | Performed by: PHYSICIAN ASSISTANT

## 2023-11-28 RX ORDER — IBUPROFEN 200 MG
200 TABLET ORAL EVERY 4 HOURS PRN
COMMUNITY

## 2023-11-28 RX ORDER — AZITHROMYCIN 500 MG/1
500 TABLET, FILM COATED ORAL DAILY
Qty: 5 TABLET | Refills: 0 | Status: SHIPPED | OUTPATIENT
Start: 2023-11-28 | End: 2023-12-03

## 2023-11-28 RX ORDER — ALBUTEROL SULFATE 90 UG/1
2 AEROSOL, METERED RESPIRATORY (INHALATION) EVERY 6 HOURS
Qty: 18 G | Refills: 0 | Status: SHIPPED | OUTPATIENT
Start: 2023-11-28 | End: 2023-12-28

## 2023-11-28 RX ORDER — BENZONATATE 100 MG/1
100 CAPSULE ORAL 3 TIMES DAILY PRN
Qty: 30 CAPSULE | Refills: 0 | Status: SHIPPED | OUTPATIENT
Start: 2023-11-28 | End: 2023-12-08

## 2023-11-29 NOTE — PROGRESS NOTES
Patient presents with:  Cough: 1.5 weeks  Nasal Congestion  Fever      Clinical Decision Making:  Treatment with respiratory antibiotic and albuterol and Tessalon Perles for cough.  Symptomatic care was gone over. Expected course of resolution and indication for return was gone over and questions were answered to patient/parent's satisfaction before discharge.        ICD-10-CM    1. Acute cough  R05.1 albuterol (PROAIR HFA/PROVENTIL HFA/VENTOLIN HFA) 108 (90 Base) MCG/ACT inhaler     azithromycin (ZITHROMAX) 500 MG tablet     benzonatate (TESSALON) 100 MG capsule          Patient Instructions   You were seen today for acute bronchitis. This is likely due to a viral illness.    Symptom management:  - Get plenty of rest  - Avoid smoking and second hand smoke  - May take tylenol or ibuprofen for fever/discomfort  - Drink plenty of non-caffeinated fluids  - Use nasal steroid spray for sinus congestion  - Albuterol inhaler may be used every 6 hours as needed for chest tightness      Reasons to be seen in the emergency room:  - Develop a fever of 100.4 or higher  - Cough changes, coughing up blood, or become short of breath  - Neck stiffness  - Chest pain  - Severe headache  - Unable to tolerate eating or drinking fluids    Otherwise, if no symptom improvement after 5 days, follow-up with your primary care provider.        HPI:  Jayda Ty is a 38 year old female who presents today 1-1/2-week history of cough with chest congestion myalgias arthralgias and low-grade subjective fever.  There is been no associated nausea vomiting diarrhea shortness of breath dyspnea on exertion pleuritic chest pain syncope or presyncope.  Patient did perform an at home COVID test which was negative yesterday at home.    History obtained from chart review and the patient.    Problem List:  2022-03: Lentigo 2 mm lower lip   2022-03: Prediabetes  2022-03: Hyperinsulinemia  2019-09: H/O atrial septal defect repair  2019-09:  Nephrolithiasis  2019: Hydronephrosis with ureteropelvic junction (UPJ) obstruction  2019: Right ureteral stone  2019: Bandemia  2015: Hx laparoscopic cholecystectomy  2015: Morbid obesity with BMI of 40.0-44.9, adult (H)  2015: Arrest of descent, delivered, current hospitalization  2015: History of   Polycystic Ovarian Syndrome  Murmurs  Gallbladder Polyp      Past Medical History:   Diagnosis Date    Aortic septal defect 2001    repaired    Arrest of descent, delivered, current hospitalization 3/7/2015    Nephrolithiasis     Ca++ Oxalate     PCOS (polycystic ovarian syndrome)     Pregnant 3/2/2015       Social History     Tobacco Use    Smoking status: Never    Smokeless tobacco: Never   Substance Use Topics    Alcohol use: No       Review of Systems  As above in HPI otherwise negative.    Vitals:    23 1752   BP: 131/84   Pulse: 74   Resp: 20   Temp: 98.3  F (36.8  C)   SpO2: 97%       General: Patient is resting comfortably no acute distress is afebrile  HEENT: Head is normocephalic atraumatic   eyes are PERRL EOMI sclera anicteric   TMs are clear bilaterally  Throat is clear and no exudate  No cervical lymphadenopathy present  LUNGS: Prolonged expiratory wheezes in the by lateral middle lung fields to lower lung fields.  Normal respiratory effort and excursion.  HEART: Regular rate and rhythm  Skin: Without rash non-diaphoretic    Physical Exam    At the end of the encounter, I discussed results, diagnosis, medications. Discussed red flags for immediate return to clinic/ER, as well as indications for follow up if no improvement. Patient understood and agreed to plan. Patient was stable for discharge.

## 2024-01-14 ENCOUNTER — OFFICE VISIT (OUTPATIENT)
Dept: FAMILY MEDICINE | Facility: CLINIC | Age: 39
End: 2024-01-14
Payer: COMMERCIAL

## 2024-01-14 VITALS
TEMPERATURE: 98.9 F | SYSTOLIC BLOOD PRESSURE: 123 MMHG | OXYGEN SATURATION: 96 % | RESPIRATION RATE: 18 BRPM | DIASTOLIC BLOOD PRESSURE: 85 MMHG | WEIGHT: 256 LBS | HEART RATE: 95 BPM | BODY MASS INDEX: 46.82 KG/M2

## 2024-01-14 DIAGNOSIS — J10.1 INFLUENZA A: Primary | ICD-10-CM

## 2024-01-14 LAB
FLUAV AG SPEC QL IA: POSITIVE
FLUBV AG SPEC QL IA: NEGATIVE

## 2024-01-14 PROCEDURE — 87635 SARS-COV-2 COVID-19 AMP PRB: CPT | Performed by: PREVENTIVE MEDICINE

## 2024-01-14 PROCEDURE — 87804 INFLUENZA ASSAY W/OPTIC: CPT | Performed by: PREVENTIVE MEDICINE

## 2024-01-14 PROCEDURE — 99214 OFFICE O/P EST MOD 30 MIN: CPT | Performed by: PREVENTIVE MEDICINE

## 2024-01-14 NOTE — PROGRESS NOTES
Assessment & Plan     (J10.1) Influenza A  (primary encounter diagnosis)  COVID pending  Tylenol, fluids, rest    Follow up in 10-14 days if not improving or sooner as needed       31 minutes spent by me on the date of the encounter doing chart review, history and exam, documentation and further activities per the note        No follow-ups on file.    Arsalan Brady MD  [facility]    Subjective     Jayda Ty is a 38 year old year old female who presents to clinic today for the following health issues:    Patient presents with:  Illness: X a few days, fever 102.5, taking ibuprofen and tylenol around the clock    This is a 39 yo female with cough, achiness, fever for 3 days.  No sob, cp, abdominal pain, n/v/c/d/blood in stool or urine, dysuria, rash.  Her  had flu like symptoms recently.      Patient Active Problem List   Diagnosis    Polycystic Ovarian Syndrome    Murmurs    Gallbladder Polyp    Morbid obesity with BMI of 40.0-44.9, adult (H)    History of     Hx laparoscopic cholecystectomy    H/O atrial septal defect repair    Nephrolithiasis    Hydronephrosis with ureteropelvic junction (UPJ) obstruction    Right ureteral stone    Bandemia    Lentigo 2 mm lower lip     Prediabetes    Hyperinsulinemia       Current Outpatient Medications   Medication    ibuprofen (ADVIL/MOTRIN) 200 MG tablet    albuterol (PROAIR HFA/PROVENTIL HFA/VENTOLIN HFA) 108 (90 Base) MCG/ACT inhaler    Semaglutide-Weight Management (WEGOVY) 0.5 MG/0.5ML SOAJ    Semaglutide-Weight Management (WEGOVY) 1 MG/0.5ML SOAJ    Semaglutide-Weight Management (WEGOVY) 1.7 MG/0.75ML SOAJ    Semaglutide-Weight Management (WEGOVY) 2.4 MG/0.75ML SOAJ    vitamin D2 (ERGOCALCIFEROL) 13478 units (1250 mcg) capsule     No current facility-administered medications for this visit.       Past Medical History:   Diagnosis Date    Aortic septal defect     repaired    Arrest of descent, delivered,  current hospitalization 3/7/2015    Nephrolithiasis     Ca++ Oxalate     PCOS (polycystic ovarian syndrome)     Pregnant 3/2/2015       Social History   reports that she has never smoked. She has never used smokeless tobacco. She reports that she does not drink alcohol and does not use drugs.    Family History   Problem Relation Age of Onset    Hypertension Mother     Heart Disease Mother 60.00        avr  2014  cabg x 1    Hypothyroidism Mother     Coronary Artery Disease Mother     Hyperlipidemia Mother     Anxiety Disorder Mother     Thyroid Disease Mother     Obesity Mother     Colon Cancer Maternal Grandmother 50.00    Anesthesia Reaction No family hx of        Review of Systems  Constitutional, HEENT, cardiovascular, pulmonary, GI, , musculoskeletal, neuro, skin, endocrine and psych systems are negative, except as otherwise noted.      Objective    /85   Pulse 95   Temp 98.9  F (37.2  C) (Tympanic)   Resp 18   Wt 116.1 kg (256 lb)   SpO2 96%   BMI 46.82 kg/m    Physical Exam   GENERAL: healthy, alert and no distress  EYES: Eyes grossly normal to inspection, PERRL and conjunctivae and sclerae normal  HENT: ear canals and TM's normal, nose and mouth without ulcers or lesions  NECK: no adenopathy, no asymmetry, masses, or scars and thyroid normal to palpation  RESP: lungs clear to auscultation - no rales, rhonchi or wheezes  CV: regular rate and rhythm, normal S1 S2, no S3 or S4, no murmur, click or rub, no peripheral edema and peripheral pulses strong  ABDOMEN: soft, nontender, no hepatosplenomegaly, no masses and bowel sounds normal  MS: no gross musculoskeletal defects noted, no edema  SKIN: no suspicious lesions or rashes  NEURO: Normal strength and tone, mentation intact and speech normal  PSYCH: mentation appears normal, affect normal/bright    Results for orders placed or performed in visit on 01/14/24   Influenza A & B Antigen - Clinic Collect     Status: Abnormal    Specimen: Nose; Swab    Result Value Ref Range    Influenza A antigen Positive (A) Negative    Influenza B antigen Negative Negative    Narrative    Test results must be correlated with clinical data. If necessary, results should be confirmed by a molecular assay or viral culture.

## 2024-01-15 LAB — SARS-COV-2 RNA RESP QL NAA+PROBE: NEGATIVE

## 2024-03-06 ASSESSMENT — SLEEP AND FATIGUE QUESTIONNAIRES
HOW LIKELY ARE YOU TO NOD OFF OR FALL ASLEEP WHILE SITTING INACTIVE IN A PUBLIC PLACE: SLIGHT CHANCE OF DOZING
HOW LIKELY ARE YOU TO NOD OFF OR FALL ASLEEP WHILE SITTING QUIETLY AFTER LUNCH WITHOUT ALCOHOL: WOULD NEVER DOZE
HOW LIKELY ARE YOU TO NOD OFF OR FALL ASLEEP WHILE WATCHING TV: MODERATE CHANCE OF DOZING
HOW LIKELY ARE YOU TO NOD OFF OR FALL ASLEEP WHILE SITTING AND READING: MODERATE CHANCE OF DOZING
HOW LIKELY ARE YOU TO NOD OFF OR FALL ASLEEP WHEN YOU ARE A PASSENGER IN A CAR FOR AN HOUR WITHOUT A BREAK: SLIGHT CHANCE OF DOZING
HOW LIKELY ARE YOU TO NOD OFF OR FALL ASLEEP IN A CAR, WHILE STOPPED FOR A FEW MINUTES IN TRAFFIC: WOULD NEVER DOZE
HOW LIKELY ARE YOU TO NOD OFF OR FALL ASLEEP WHILE SITTING AND TALKING TO SOMEONE: WOULD NEVER DOZE
HOW LIKELY ARE YOU TO NOD OFF OR FALL ASLEEP WHILE LYING DOWN TO REST IN THE AFTERNOON WHEN CIRCUMSTANCES PERMIT: HIGH CHANCE OF DOZING

## 2024-03-07 ENCOUNTER — OFFICE VISIT (OUTPATIENT)
Dept: SURGERY | Facility: CLINIC | Age: 39
End: 2024-03-07
Payer: COMMERCIAL

## 2024-03-07 ENCOUNTER — LAB (OUTPATIENT)
Dept: LAB | Facility: CLINIC | Age: 39
End: 2024-03-07
Payer: COMMERCIAL

## 2024-03-07 VITALS
BODY MASS INDEX: 46.46 KG/M2 | DIASTOLIC BLOOD PRESSURE: 78 MMHG | WEIGHT: 252.5 LBS | HEIGHT: 62 IN | SYSTOLIC BLOOD PRESSURE: 118 MMHG

## 2024-03-07 DIAGNOSIS — R73.03 PREDIABETES: ICD-10-CM

## 2024-03-07 DIAGNOSIS — Z87.442 HISTORY OF KIDNEY STONES: ICD-10-CM

## 2024-03-07 DIAGNOSIS — E66.01 MORBID OBESITY WITH BMI OF 40.0-44.9, ADULT (H): Primary | ICD-10-CM

## 2024-03-07 DIAGNOSIS — R06.83 SNORING: ICD-10-CM

## 2024-03-07 DIAGNOSIS — E28.2 POLYCYSTIC OVARIES: ICD-10-CM

## 2024-03-07 DIAGNOSIS — E66.01 MORBID OBESITY WITH BMI OF 40.0-44.9, ADULT (H): ICD-10-CM

## 2024-03-07 LAB
ALBUMIN SERPL BCG-MCNC: 4.5 G/DL (ref 3.5–5.2)
ALP SERPL-CCNC: 109 U/L (ref 40–150)
ALT SERPL W P-5'-P-CCNC: 55 U/L (ref 0–50)
ANION GAP SERPL CALCULATED.3IONS-SCNC: 7 MMOL/L (ref 7–15)
AST SERPL W P-5'-P-CCNC: 31 U/L (ref 0–45)
BILIRUB SERPL-MCNC: 0.4 MG/DL
BUN SERPL-MCNC: 13.1 MG/DL (ref 6–20)
CALCIUM SERPL-MCNC: 9.5 MG/DL (ref 8.6–10)
CHLORIDE SERPL-SCNC: 105 MMOL/L (ref 98–107)
CREAT SERPL-MCNC: 0.76 MG/DL (ref 0.51–0.95)
DEPRECATED HCO3 PLAS-SCNC: 27 MMOL/L (ref 22–29)
EGFRCR SERPLBLD CKD-EPI 2021: >90 ML/MIN/1.73M2
GLUCOSE SERPL-MCNC: 91 MG/DL (ref 70–99)
HBA1C MFR BLD: 5.7 % (ref 0–5.6)
POTASSIUM SERPL-SCNC: 4.4 MMOL/L (ref 3.4–5.3)
PROT SERPL-MCNC: 7.2 G/DL (ref 6.4–8.3)
SODIUM SERPL-SCNC: 139 MMOL/L (ref 135–145)

## 2024-03-07 PROCEDURE — 80053 COMPREHEN METABOLIC PANEL: CPT

## 2024-03-07 PROCEDURE — 99205 OFFICE O/P NEW HI 60 MIN: CPT | Performed by: FAMILY MEDICINE

## 2024-03-07 PROCEDURE — 36415 COLL VENOUS BLD VENIPUNCTURE: CPT

## 2024-03-07 PROCEDURE — 83036 HEMOGLOBIN GLYCOSYLATED A1C: CPT

## 2024-03-07 NOTE — PATIENT INSTRUCTIONS

## 2024-03-07 NOTE — LETTER
"    3/7/2024         RE: Jayda Ty  [address]  [place] [address]        Dear Dr. Patterson,    Thank you for referring your patient, Jayda Ty, to the [facility] [place]. Please see a copy of my visit note below.    New Bariatric Surgery Consultation Note- Non surgical weight loss note    2024    RE: Jayda Ty  MR#: [id number]  : 1985            Chief Complaint/Reason for visit: evaluation for possible weight loss surgery. She has decided since scheduling this appointment that she is not interested in surgery at this time.        I had the pleasure of seeing your patient, Jayda Ty, to evaluate her obesity and consider her for possible weight loss surgery. As you know, Jayda Ty is 39 year old.  She has a height of 5' 2\", a weight of 252 lbs 8 oz, and calculated Body mass index is 46.18 kg/m .        HISTORY OF PRESENT ILLNESS:      3/6/2024     9:16 PM   Weight Loss History Reviewed with Patient   How long have you been overweight? Since puberty   What is the most weight you have lost? 272   I have tried the following methods to lose weight Watching portions or calories    Atkins type diet (low carb/high protein)    Pre packaged meals ex: Nutrisystem    Prescription Medications   I have tried the following weight loss medications? (Check all that apply) Phentermine/Adipex-p/Suprenza       CO-MORBIDITIES OF OBESITY INCLUDE:      3/6/2024     9:16 PM   --   I have the following health issues associated with obesity Polycystic Ovarian Syndrome    Infertility       PAST MEDICAL HISTORY:  Past Medical History:   Diagnosis Date     Anxiety      Aortic septal defect 2001    repaired     Arrest of descent, delivered, current hospitalization 2015     Kidney stone 2019     Nephrolithiasis     Ca++ Oxalate      PCOS (polycystic ovarian syndrome)      Pregnant 2015       PAST SURGICAL HISTORY:  Past Surgical " History:   Procedure Laterality Date     ASD REPAIR  2001     C/SECTION, LOW TRANSVERSE  3/3/2015     CARDIAC SURGERY  2001    ASD repair     LAPAROSCOPIC CHOLECYSTECTOMY N/A 9/18/2015    Procedure: CHOLECYSTECTOMY LAPAROSCOPIC;  Surgeon: Michael Malloy MD;  Location: [facility] OR;  Service:      OTHER SURGICAL HISTORY      tonsilsat 11 years old     WISDOM TOOTH EXTRACTION      8th grade per patient       FAMILY HISTORY:   Family History   Problem Relation Age of Onset     Hypertension Mother      Heart Disease Mother 60        avr  2014  cabg x 1     Hypothyroidism Mother      Coronary Artery Disease Mother      Hyperlipidemia Mother      Anxiety Disorder Mother      Thyroid Disease Mother      Obesity Mother      Colon Cancer Maternal Grandmother 50     Anesthesia Reaction No family hx of        SOCIAL HISTORY:       3/6/2024     9:16 PM   Social History Questions Reviewed With Patient   Which best describes your employment status (select all that apply) I work part-time    I work days   If you work, 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 Yes   Who can you count on for support throughout your weight loss surgery journey?  , mother       HABITS:      3/6/2024     9:16 PM   --   How often do you drink alcohol? Monthly or less   If you do drink alcohol, how many drinks might you have in a day? (one drink = 5 oz. wine, 1 can/bottle of beer, 1 shot liquor) 1 or 2   Do you currently use any of the following Nicotine products? No   Have you ever used any of the following nicotine products? No   Have you or are you currently using street drugs or prescription strength medication for which you do not have a prescription for? No   Do you have a history of chemical dependency (alcohol or drug abuse)? No     Patient recently did a 30 day challenge where she  cut out gluten, dairy and sugar and she felt better while doing it. She is trying to continue to make good choices. She is meal planning more. Her mindset is now more positive. Her  has lost 100#. She tried naltrexone/wellbutrin in the past and she doesn't remember if it helped again. She might have had more irritability with it but she is not sure. She was on wegovy in 2022. She had significant stomach pain when she ate when she got to higher doses. She  might have been overeating. She struggled with some constipation.     PSYCHOLOGICAL HISTORY:       3/6/2024     9:16 PM   Psychological History Reviewed With Patient   Have you ever attempted suicide? Never.   Have you had thoughts of suicide in the past year? No   Have you ever been hospitalized for mental illness or a suicide attempt? Never.   Do you have a history of chronic pain? No   Have you ever been diagnosed with fibromyalgia? No   Are you currently being treated for any of the following? (select all that apply) Anxiety   Are you currently seeing a therapist or counselor? Yes   Are you currently seeing a psychiatrist? No       ROS:      3/6/2024     9:16 PM   --   Skin None of the above   HEENT None of these   Musculoskeletal Back pain   Cardiovascular Shortness of breath with activity   Pulmonary Shortness of breath with activity    Snoring   Gastrointestinal Reflux   Genitourinary Kidney stones   Hematological None of the above   Neurological None of the above   Female only Irregular menstrual cycles    Polycystic ovarian syndrome (PCOS)       EATING BEHAVIORS:      3/6/2024     9:16 PM   --   Have you or anyone else thought that you had an eating disorder? No   Do you currently binge eat (eat a large amount of food in a short time)? No   Are you an emotional eater? Yes   Do you get up to eat after falling asleep? No   Can you afford 3 meals a day? Yes   Can you afford 50-60 dollars a month for vitamins? Yes     Meals: B: eggs or chicken sausage  "or protein shake with fruit and peanut butter  L: chicken sausage with potatoes and veggies or sandwich D: similar to lunch    Snacks: fruit, beef sticks    EXERCISE:      3/6/2024     9:16 PM   --   How often do you exercise? Less than 1 time per week   What is the duration of your exercise (in minutes)? 30 Minutes   What types of exercise do you do? walking   What keeps you from being more active? I should be more active but I just have not gotten around to it    Worried people will look at me   Not exercising regularly in the past month. She tried total boxing but doesn't feel comfortable there. She is considering going to the gym.    MEDICATIONS:  Current Outpatient Medications   Medication Sig Dispense Refill     Cholecalciferol (VITAMIN D3 PO) Take by mouth daily 6 sprays into mouth daily       ibuprofen (ADVIL/MOTRIN) 200 MG tablet Take 200 mg by mouth every 4 hours as needed for pain       albuterol (PROAIR HFA/PROVENTIL HFA/VENTOLIN HFA) 108 (90 Base) MCG/ACT inhaler Inhale 2 puffs into the lungs every 6 hours for 30 days (Patient not taking: Reported on 3/7/2024) 18 g 0     ROS  General  Fatigue: no  HEENT  Hx of glaucoma: no  Vision changes: no  Cardiovascular  Hx of heart disease: ASD repair, no current heart issues  Chest Pain with Exertion: no  Palpitations: sometimes  Pulmonary  Shortness of breath at rest: no  Shortness of breath with exertion: yes  Stop-bang score: 5  [place] Score: EW 9  Gastrointestinal  Heartburn: no  Pancreatitis: no  Psychiatric  Moods Stable: yes  Endocrine  Polydipsia: no  No personal or family history of medullary thyroid cancer: no  Neurologic  Hx of seizures: no  Migraine headaches: no    Birth control: none  Kidney stones: yes     ALLERGIES:  No Known Allergies    LABS/IMAGING/MEDICAL RECORDS REVIEW: recent lipid fairly  normal, nl TSH    PHYSICAL EXAM:  /78   Ht 1.575 m (5' 2\")   Wt 114.5 kg (252 lb 8 oz)   BMI 46.18 kg/m        GEN: Alert and oriented in no " acute distress.   HEENT: mucous membranes moist  ABDOMEN: moderate protuberance     1. Morbid obesity with BMI of 40.0-44.9, adult (H)  We discussed healthy habits to assist with weight loss. She will work on planning meals ahead of time using the plate method for portion control and macronutrient proportions. She will try keeping a food journal. She is currently on track with her eating. Patient will set appointments with herself for exercise.   We discussed medication that may assist with weight loss. She would like to try zepbound and would be willing to try wegovy again. She was referred to [facility] pharmacist. We could consider wellbutrin/naltrexone again. Risks/ benefits and possible side effects were discussed and questions were answered. Written information was given.  CMP and A1C ordered. We briefly discussed 24 week program. Sleep consult ordered.    2. Prediabetes  This may improve with healthy habits and weight loss.     3. Polycystic Ovarian Syndrome  This may improve with healthy habits and weight loss.       Sincerely,     RICK Toney MD    Total time spent on the date of this encounter doing: chart review, review of test results, patient visit, physical exam, education, counseling, developing plan of care and documenting = 60 minutes.       Again, thank you for allowing me to participate in the care of your patient.        Sincerely,        RICK Toney MD

## 2024-03-07 NOTE — PROGRESS NOTES
"New Bariatric Surgery Consultation Note- Non surgical weight loss note    2024    RE: Jayda Ty  MR#: [id number]  : 1985            Chief Complaint/Reason for visit: evaluation for possible weight loss surgery. She has decided since scheduling this appointment that she is not interested in surgery at this time.        I had the pleasure of seeing your patient, Jayda Ty, to evaluate her obesity and consider her for possible weight loss surgery. As you know, Jayda Ty is 39 year old.  She has a height of 5' 2\", a weight of 252 lbs 8 oz, and calculated Body mass index is 46.18 kg/m .        HISTORY OF PRESENT ILLNESS:      3/6/2024     9:16 PM   Weight Loss History Reviewed with Patient   How long have you been overweight? Since puberty   What is the most weight you have lost? 272   I have tried the following methods to lose weight Watching portions or calories    Atkins type diet (low carb/high protein)    Pre packaged meals ex: Nutrisystem    Prescription Medications   I have tried the following weight loss medications? (Check all that apply) Phentermine/Adipex-p/Suprenza       CO-MORBIDITIES OF OBESITY INCLUDE:      3/6/2024     9:16 PM   --   I have the following health issues associated with obesity Polycystic Ovarian Syndrome    Infertility       PAST MEDICAL HISTORY:  Past Medical History:   Diagnosis Date    Anxiety     Aortic septal defect     repaired    Arrest of descent, delivered, current hospitalization 2015    Kidney stone 2019    Nephrolithiasis     Ca++ Oxalate     PCOS (polycystic ovarian syndrome)     Pregnant 2015       PAST SURGICAL HISTORY:  Past Surgical History:   Procedure Laterality Date    ASD REPAIR      C/SECTION, LOW TRANSVERSE  3/3/2015    CARDIAC SURGERY      ASD repair    LAPAROSCOPIC CHOLECYSTECTOMY N/A 2015    Procedure: CHOLECYSTECTOMY LAPAROSCOPIC;  Surgeon: Michael Malloy MD;  Location: [facility] OR;  " Service:     OTHER SURGICAL HISTORY      tonsilsat 11 years old    WISDOM TOOTH EXTRACTION      8th grade per patient       FAMILY HISTORY:   Family History   Problem Relation Age of Onset    Hypertension Mother     Heart Disease Mother 60        avr  2014  cabg x 1    Hypothyroidism Mother     Coronary Artery Disease Mother     Hyperlipidemia Mother     Anxiety Disorder Mother     Thyroid Disease Mother     Obesity Mother     Colon Cancer Maternal Grandmother 50    Anesthesia Reaction No family hx of        SOCIAL HISTORY:       3/6/2024     9:16 PM   Social History Questions Reviewed With Patient   Which best describes your employment status (select all that apply) I work part-time    I work days   If you work, 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 Yes   Who can you count on for support throughout your weight loss surgery journey?  , mother       HABITS:      3/6/2024     9:16 PM   --   How often do you drink alcohol? Monthly or less   If you do drink alcohol, how many drinks might you have in a day? (one drink = 5 oz. wine, 1 can/bottle of beer, 1 shot liquor) 1 or 2   Do you currently use any of the following Nicotine products? No   Have you ever used any of the following nicotine products? No   Have you or are you currently using street drugs or prescription strength medication for which you do not have a prescription for? No   Do you have a history of chemical dependency (alcohol or drug abuse)? No     Patient recently did a 30 day challenge where she cut out gluten, dairy and sugar and she felt better while doing it. She is trying to continue to make good choices. She is meal planning more. Her mindset is now more positive. Her  has lost 100#. She tried naltrexone/wellbutrin in the past and she doesn't remember if it helped again. She might  have had more irritability with it but she is not sure. She was on wegovy in 2022. She had significant stomach pain when she ate when she got to higher doses. She  might have been overeating. She struggled with some constipation.     PSYCHOLOGICAL HISTORY:       3/6/2024     9:16 PM   Psychological History Reviewed With Patient   Have you ever attempted suicide? Never.   Have you had thoughts of suicide in the past year? No   Have you ever been hospitalized for mental illness or a suicide attempt? Never.   Do you have a history of chronic pain? No   Have you ever been diagnosed with fibromyalgia? No   Are you currently being treated for any of the following? (select all that apply) Anxiety   Are you currently seeing a therapist or counselor? Yes   Are you currently seeing a psychiatrist? No       ROS:      3/6/2024     9:16 PM   --   Skin None of the above   HEENT None of these   Musculoskeletal Back pain   Cardiovascular Shortness of breath with activity   Pulmonary Shortness of breath with activity    Snoring   Gastrointestinal Reflux   Genitourinary Kidney stones   Hematological None of the above   Neurological None of the above   Female only Irregular menstrual cycles    Polycystic ovarian syndrome (PCOS)       EATING BEHAVIORS:      3/6/2024     9:16 PM   --   Have you or anyone else thought that you had an eating disorder? No   Do you currently binge eat (eat a large amount of food in a short time)? No   Are you an emotional eater? Yes   Do you get up to eat after falling asleep? No   Can you afford 3 meals a day? Yes   Can you afford 50-60 dollars a month for vitamins? Yes     Meals: B: eggs or chicken sausage or protein shake with fruit and peanut butter  L: chicken sausage with potatoes and veggies or sandwich D: similar to lunch    Snacks: fruit, beef sticks    EXERCISE:      3/6/2024     9:16 PM   --   How often do you exercise? Less than 1 time per week   What is the duration of your exercise (in  "minutes)? 30 Minutes   What types of exercise do you do? walking   What keeps you from being more active? I should be more active but I just have not gotten around to it    Worried people will look at me   Not exercising regularly in the past month. She tried total boxing but doesn't feel comfortable there. She is considering going to the gym.    MEDICATIONS:  Current Outpatient Medications   Medication Sig Dispense Refill    Cholecalciferol (VITAMIN D3 PO) Take by mouth daily 6 sprays into mouth daily      ibuprofen (ADVIL/MOTRIN) 200 MG tablet Take 200 mg by mouth every 4 hours as needed for pain      albuterol (PROAIR HFA/PROVENTIL HFA/VENTOLIN HFA) 108 (90 Base) MCG/ACT inhaler Inhale 2 puffs into the lungs every 6 hours for 30 days (Patient not taking: Reported on 3/7/2024) 18 g 0     ROS  General  Fatigue: no  HEENT  Hx of glaucoma: no  Vision changes: no  Cardiovascular  Hx of heart disease: ASD repair, no current heart issues  Chest Pain with Exertion: no  Palpitations: sometimes  Pulmonary  Shortness of breath at rest: no  Shortness of breath with exertion: yes  Stop-bang score: 5  Warwick Score: EW 9  Gastrointestinal  Heartburn: no  Pancreatitis: no  Psychiatric  Moods Stable: yes  Endocrine  Polydipsia: no  No personal or family history of medullary thyroid cancer: no  Neurologic  Hx of seizures: no  Migraine headaches: no    Birth control: none  Kidney stones: yes     ALLERGIES:  No Known Allergies    LABS/IMAGING/MEDICAL RECORDS REVIEW: recent lipid fairly  normal, nl TSH    PHYSICAL EXAM:  /78   Ht 1.575 m (5' 2\")   Wt 114.5 kg (252 lb 8 oz)   BMI 46.18 kg/m        GEN: Alert and oriented in no acute distress.   HEENT: mucous membranes moist  ABDOMEN: moderate protuberance     1. Morbid obesity with BMI of 40.0-44.9, adult (H)  We discussed healthy habits to assist with weight loss. She will work on planning meals ahead of time using the plate method for portion control and macronutrient " proportions. She will try keeping a food journal. She is currently on track with her eating. Patient will set appointments with herself for exercise.   We discussed medication that may assist with weight loss. She would like to try zepbound and would be willing to try wegovy again. She was referred to [facility] pharmacist. We could consider wellbutrin/naltrexone again. Risks/ benefits and possible side effects were discussed and questions were answered. Written information was given.  CMP and A1C ordered. We briefly discussed 24 week program. Sleep consult ordered.    2. Prediabetes  This may improve with healthy habits and weight loss.     3. Polycystic Ovarian Syndrome  This may improve with healthy habits and weight loss.       Sincerely,     RICK Toney MD    Total time spent on the date of this encounter doing: chart review, review of test results, patient visit, physical exam, education, counseling, developing plan of care and documenting = 60 minutes.

## 2024-03-11 DIAGNOSIS — E66.01 MORBID OBESITY WITH BMI OF 40.0-44.9, ADULT (H): Primary | ICD-10-CM

## 2024-03-11 RX ORDER — BUPROPION HYDROCHLORIDE 100 MG/1
TABLET ORAL
Qty: 180 TABLET | Refills: 1 | Status: SHIPPED | OUTPATIENT
Start: 2024-03-11 | End: 2024-05-28

## 2024-03-11 RX ORDER — NALTREXONE HYDROCHLORIDE 50 MG/1
TABLET, FILM COATED ORAL
Qty: 90 TABLET | Refills: 1 | Status: SHIPPED | OUTPATIENT
Start: 2024-03-11 | End: 2024-05-28

## 2024-04-10 ENCOUNTER — TELEPHONE (OUTPATIENT)
Dept: CARDIOLOGY | Facility: CLINIC | Age: 39
End: 2024-04-10
Payer: COMMERCIAL

## 2024-04-10 NOTE — TELEPHONE ENCOUNTER
Left Voicemail (1st Attempt) and Sent Mychart (1st Attempt) for the patient to call back and schedule the following:    Appointment type: NEW-MTM  Provider: Paul Schaeffer or other  Return date: Reschedule today's  appointment as provider is out/first available  Specialty phone number: [phone]  Additional appointment(s) needed: n/a  Additonal Notes: n/a

## 2024-05-28 ENCOUNTER — TELEPHONE (OUTPATIENT)
Dept: CARDIOLOGY | Facility: CLINIC | Age: 39
End: 2024-05-28
Payer: COMMERCIAL

## 2024-05-28 ENCOUNTER — VIRTUAL VISIT (OUTPATIENT)
Dept: CARDIOLOGY | Facility: CLINIC | Age: 39
End: 2024-05-28
Attending: FAMILY MEDICINE
Payer: COMMERCIAL

## 2024-05-28 VITALS — BODY MASS INDEX: 45.73 KG/M2 | WEIGHT: 250 LBS

## 2024-05-28 DIAGNOSIS — E66.01 MORBID OBESITY WITH BMI OF 40.0-44.9, ADULT (H): Primary | ICD-10-CM

## 2024-05-28 ASSESSMENT — PAIN SCALES - GENERAL: PAINLEVEL: NO PAIN (0)

## 2024-05-28 NOTE — Clinical Note
Evelina Toney,  I met with Jayda today to discuss initiation of GLP-1 agonist therapy.  She had been on Wegovy as you are aware in the past and had some troubles with GI adverse effects, however attributes this to dietary indiscretion and would like to try Wegovy again given the lack of access to Zepbound currently.  I have a close follow-up plan for her with Leatha Serrano, PharmD in July.  Please let me know if you have any questions or concerns. Thank you, Dwayne

## 2024-05-28 NOTE — PROGRESS NOTES
Medication Therapy Management (MTM) Encounter    ASSESSMENT:                            Medication Adherence/Access: No issues identified    Weight management: Weight unchanged.  Appropriate candidate for initiation of GLP-1 agonist therapy, pretreatment BMI greater than 40 kg/m . Negative history of pancreatitis, medullary thyroid cancer and multiple endocrine neoplasia type 2.  Baseline difficulties with acid reflux symptoms, manageable with dietary modifications and as needed calcium carbonate.  Historical use of Wegovy was poorly tolerated but attributable to dietary indiscretions.  Reasonable to attempt rechallenge of Wegovy given current supply constraints of Zepbound, which would otherwise be preferable.  Will consider transition to Zepbound depending on her response to Wegovy.  Reviewed mechanism, adverse effects, monitoring, safety, administration with use of Wegovy.    For patients that are under [employer] insurance coverage, it is mandated by insurance that each qualifying patient meet with hospital based Weight Management Medication Therapy Management pharmacist to continue therapy coverage. The following patient meets the below coverage criteria and can therefore continue GLP-1/GIP agonist therapy for Weight Management:    Adult  BMI >40 with or without comorbidities   OR   BMI >30 + NAFLD*   at time of initiating GLP-1/GIP agonist therapy Approved for 29 weeks  Met Updated Initial Criteria   At least 5% weight loss of baseline body weight  Approved for 12 months        PLAN:                            Start Wegovy: It is a subcutaneous injection that you inject once weekly and titrate the dose slowly over time. Make sure inject the same time each week (e.g. every Sunday AM).   Week 1-4: Inject 0.25 mg once weekly  Week 5-8: If tolerating, increase to 0.5 mg once weekly   Week 9-12: If tolerating, increase to 1 mg once weekly   *If you are having some nausea or other side effect(s)  to where you are hesitant to move up to the next dose, stay at the same dose you are on for an additional 4 weeks to see if side effects improves/resolves - let the clinic know if that is the case because an additional prescription will need to be sent in for that current dose for refill. Make sure to take this time to hydrate and ensure you are drinking at least 64 oz water per day.      Follow-up with Leatha Serrano PharmD in July as scheduled.    SUBJECTIVE/OBJECTIVE:                          Jayda Ty is a 39 year old female called for an initial visit. She was referred to me from [facility] insurance requirements, STEPHAN Toney .      Reason for visit: GLP-1 agonist consult.    Allergies/ADRs: Reviewed in chart  Past Medical History: Reviewed in chart  Tobacco: She reports that she has never smoked. She has never used smokeless tobacco.    Medication Adherence/Access: no issues reported    Weight management:  Phone consult to discuss initiation of GLP-1 agonist therapy.  Patient had recently trialed combination bupropion and naltrexone, but felt it caused difficulties with fatigue as well as emotional variability coupled with limited weight loss benefit, subsequently discontinued.      Has been working with Dr. Toney recently but in the past in 2021 as well. Was on Wegovy in the past, did not tolerate from a GI standpoint, total duration was 2-3 months, stopped due to GI adverse effects. Did not titrate to 2.4 mg. Feels now she is in a better place to address medication adverse effect related concerns. Has been meal planning, making modifications now and feels she can continue these efforts to enable improved medication tolerance.     Medication History:  Bupropion/Naltrexone - as above   Wegovy - stomach discomfort, more than expected. But did not make dietary modifications   Fluid/Water intake: 50 oz water daily, trying to get closer to 80 oz   Diet: No/limited alcohol intake. Feels the best when eating with a  Paleo approach, limiting gluten, caffeine, alcohol. Beans, nuts, brown rice, sweet potatoes  Physical activity: Limited currently. Gardening a very large space, but working more towards general     Medical History:  MEN2/Medullary Thyroid Cancer: Negative   Pancreatitis: Negative   Baseline GI symptomatology: More acid reflux of late. Takes TUMS with effect.  Typically food trigger related.     Wt Readings from Last 4 Encounters:   05/28/24 113.4 kg (250 lb)   03/07/24 114.5 kg (252 lb 8 oz)   01/14/24 116.1 kg (256 lb)   01/16/23 111.1 kg (245 lb)     Body Mass Index (BMI) Body mass index is 45.73 kg/m .    Today's Vitals: Wt 113.4 kg (250 lb)   BMI 45.73 kg/m      Lab Results   Component Value Date    A1C 5.7 03/07/2024    A1C 5.8 03/01/2022    A1C 5.5 09/06/2016       ----------------      I spent 30 minutes with this patient today. All changes were made via collaborative practice agreement with Vidhya Martínez PA-C . A copy of the visit note was provided to the patient's provider(s).    A summary of these recommendations was sent via [employer].    Paul Schaeffer, PharmD, BCACP  Medication Therapy Management Pharmacist  [facility]     Telemedicine Visit Details  Type of service:  Telephone visit  Start Time:  300pm  End Time:  330pm     Medication Therapy Recommendations  No medication therapy recommendations to display

## 2024-05-28 NOTE — TELEPHONE ENCOUNTER
Hello team,    Forwarding encounter as a request to initiate prior authorization for Wegovy 0.25 mg weekly. Please let me know if you need any additional details or information.    Thank you for your help,  Dwayne

## 2024-05-28 NOTE — Clinical Note
5/28/2024      RE: Jadya Ty  [address]  [facility] [address]       Dear Colleague,    Thank you for the opportunity to participate in the care of your patient, Jayda Ty, at the [facility]. Please see a copy of my visit note below.    Medication Therapy Management (MTM) Encounter    ASSESSMENT:                            Medication Adherence/Access: No issues identified    Weight management: Weight unchanged.  Appropriate candidate for initiation of GLP-1 agonist therapy, pretreatment BMI greater than 40 kg/m . Negative history of pancreatitis, medullary thyroid cancer and multiple endocrine neoplasia type 2.  Baseline difficulties with acid reflux symptoms, manageable with dietary modifications and as needed calcium carbonate.  Historical use of Wegovy was poorly tolerated but attributable to dietary indiscretions.  Reasonable to attempt rechallenge of Wegovy given current supply constraints of Zepbound, which would otherwise be preferable.  Will consider transition to Zepbound depending on her response to Wegovy.  Reviewed mechanism, adverse effects, monitoring, safety, administration with use of Wegovy.    For patients that are under [employer] insurance coverage, it is mandated by insurance that each qualifying patient meet with hospital based Weight Management Medication Therapy Management pharmacist to continue therapy coverage. The following patient meets the below coverage criteria and can therefore continue GLP-1/GIP agonist therapy for Weight Management:    Adult  BMI >40 with or without comorbidities   OR   BMI >30 + NAFLD*   at time of initiating GLP-1/GIP agonist therapy Approved for 29 weeks  Met Updated Initial Criteria   At least 5% weight loss of baseline body weight  Approved for 12 months        PLAN:                            Start Wegovy: It is a subcutaneous  injection that you inject once weekly and titrate the dose slowly over time. Make sure inject the same time each week (e.g. every Sunday AM).   Week 1-4: Inject 0.25 mg once weekly  Week 5-8: If tolerating, increase to 0.5 mg once weekly   Week 9-12: If tolerating, increase to 1 mg once weekly   *If you are having some nausea or other side effect(s) to where you are hesitant to move up to the next dose, stay at the same dose you are on for an additional 4 weeks to see if side effects improves/resolves - let the clinic know if that is the case because an additional prescription will need to be sent in for that current dose for refill. Make sure to take this time to hydrate and ensure you are drinking at least 64 oz water per day.      Follow-up with Leatha Serrano PharmD in July as scheduled.    SUBJECTIVE/OBJECTIVE:                          Jayda Ty is a 39 year old female called for an initial visit. She was referred to me from [facility] insurance requirements, STEPHAN Toney .      Reason for visit: GLP-1 agonist consult.    Allergies/ADRs: Reviewed in chart  Past Medical History: Reviewed in chart  Tobacco: She reports that she has never smoked. She has never used smokeless tobacco.    Medication Adherence/Access: no issues reported    Weight management:  Phone consult to discuss initiation of GLP-1 agonist therapy.  Patient had recently trialed combination bupropion and naltrexone, but felt it caused difficulties with fatigue as well as emotional variability coupled with limited weight loss benefit, subsequently discontinued.      Has been working with Dr. Toney recently but in the past in 2021 as well. Was on Wegovy in the past, did not tolerate from a GI standpoint, total duration was 2-3 months, stopped due to GI adverse effects. Did not titrate to 2.4 mg. Feels now she is in a better place to address medication adverse effect related concerns. Has been meal planning, making modifications now and feels she can  continue these efforts to enable improved medication tolerance.     Medication History:  Bupropion/Naltrexone - as above   Wegovy - stomach discomfort, more than expected. But did not make dietary modifications   Fluid/Water intake: 50 oz water daily, trying to get closer to 80 oz   Diet: No/limited alcohol intake. Feels the best when eating with a Paleo approach, limiting gluten, caffeine, alcohol. Beans, nuts, brown rice, sweet potatoes  Physical activity: Limited currently. Gardening a very large space, but working more towards general     Medical History:  MEN2/Medullary Thyroid Cancer: Negative   Pancreatitis: Negative   Baseline GI symptomatology: More acid reflux of late. Takes TUMS with effect.  Typically food trigger related.     Wt Readings from Last 4 Encounters:   05/28/24 113.4 kg (250 lb)   03/07/24 114.5 kg (252 lb 8 oz)   01/14/24 116.1 kg (256 lb)   01/16/23 111.1 kg (245 lb)     Body Mass Index (BMI) Body mass index is 45.73 kg/m .    Today's Vitals: Wt 113.4 kg (250 lb)   BMI 45.73 kg/m      Lab Results   Component Value Date    A1C 5.7 03/07/2024    A1C 5.8 03/01/2022    A1C 5.5 09/06/2016       ----------------      I spent 30 minutes with this patient today. All changes were made via collaborative practice agreement with Vidhya Martínez PA-C . A copy of the visit note was provided to the patient's provider(s).    A summary of these recommendations was sent via [employer].    Paul Schaeffer, PharmD, BCACP  Medication Therapy Management Pharmacist  [facility]     Telemedicine Visit Details  Type of service:  Telephone visit  Start Time:  300pm  End Time:  330pm     Medication Therapy Recommendations  No medication therapy recommendations to display         Please do not hesitate to contact me if you have any questions/concerns.     Sincerely,     PAUL SCHAEFFER [facility]

## 2024-05-28 NOTE — NURSING NOTE
Is the patient currently in the state of [place]? YES    Visit mode:TELEPHONE    If the visit is dropped, the patient can be reconnected by: TELEPHONE VISIT: Phone number:   Telephone Information:   Mobile [phone]       Will anyone else be joining the visit? NO  (If patient encounters technical issues they should call [phone] :[id number])    How would you like to obtain your AVS? MyChart    Are changes needed to the allergy or medication list? No, Pt stated no changes to allergies, and Pt stated no med changes    Are refills needed on medications prescribed by this physician? NO    Reason for visit: Consult    Marifer TANG

## 2024-05-28 NOTE — PATIENT INSTRUCTIONS
"Recommendations from today's MTM visit:                                                    MTM (medication therapy management) is a service provided by a clinical pharmacist designed to help you get the most of out of your medicines.      Start Wegovy: It is a subcutaneous injection that you inject once weekly and titrate the dose slowly over time. Make sure inject the same time each week (e.g. every Sunday AM).   Week 1-4: Inject 0.25 mg once weekly  Week 5-8: If tolerating, increase to 0.5 mg once weekly   Week 9-12: If tolerating, increase to 1 mg once weekly   *If you are having some nausea or other side effect(s) to where you are hesitant to move up to the next dose, stay at the same dose you are on for an additional 4 weeks to see if side effects improves/resolves - let the clinic know if that is the case because an additional prescription will need to be sent in for that current dose for refill. Make sure to take this time to hydrate and ensure you are drinking at least 64 oz water per day.       Follow-up with Leatha Serrano PharmD in July as scheduled.    It was great speaking with you today.  I value your experience and would be very thankful for your time in providing feedback in our clinic survey. In the next few days, you may receive an email or text message from [employer] with a link to a survey related to your  clinical pharmacist.\"     To schedule another MTM appointment, please call the clinic directly or you may call the MTM scheduling line at [phone].    My Clinical Pharmacist's contact information:                                                      Please feel free to contact me with any questions or concerns you have.      Palu Schaeffer PharmD, [facility] Corporation[facility]  Medication Therapy Management Pharmacist  [facility]    "

## 2024-05-29 NOTE — TELEPHONE ENCOUNTER
PA Initiation    Medication: WEGOVY 0.25 MG/0.5ML SC SOAJ  Insurance Company: [employer] - Phone [phone] Fax [phone]  Pharmacy Filling the Rx: [facility] - [place], [place] - [facility] [address]  Filling Pharmacy Phone: [phone]  Filling Pharmacy Fax: [phone]  Start Date: 5/29/2024       Thank you,     Les Samaniego [facility]  Pharmacy Clinic [facility] [address]  [email or web address]   Phone: [phone]  Fax: [phone]

## 2024-05-29 NOTE — TELEPHONE ENCOUNTER
Prior Authorization Approval    Medication: WEGOVY 0.25 MG/0.5ML SC SOAJ  Authorization Effective Date: 5/29/2024  Authorization Expiration Date: 12/18/2024  Approved Dose/Quantity: 2 ml per 28 days  Reference #: [id number]   Insurance Company: [employer] - Phone [phone] Fax [phone]  Expected CoPay: $    CoPay Card Available:      Financial Assistance Needed: No  Which Pharmacy is filling the prescription: [facility] - [address]  Pharmacy Notified: Yes  Patient Notified: Yes        Thank you,     Les Samaniego [facility]  Pharmacy Clinic [facility] [email or web address]   Phone: [phone]  Fax: [phone]

## 2024-07-14 ENCOUNTER — HEALTH MAINTENANCE LETTER (OUTPATIENT)
Age: 39
End: 2024-07-14

## 2024-07-25 ENCOUNTER — TELEPHONE (OUTPATIENT)
Dept: SURGERY | Facility: CLINIC | Age: 39
End: 2024-07-25
Payer: COMMERCIAL

## 2024-07-25 NOTE — TELEPHONE ENCOUNTER
MTM appointment canceled, we made one more attempt to reschedule.     Routing back to referring provider and MTM Pharmacist Team        César Kirk  MTM

## 2025-02-15 ENCOUNTER — HEALTH MAINTENANCE LETTER (OUTPATIENT)
Age: 40
End: 2025-02-15

## 2025-02-26 ENCOUNTER — HOSPITAL ENCOUNTER (OUTPATIENT)
Dept: MAMMOGRAPHY | Facility: CLINIC | Age: 40
Discharge: HOME | End: 2025-02-26
Attending: FAMILY MEDICINE
Payer: COMMERCIAL

## 2025-02-26 DIAGNOSIS — Z12.31 VISIT FOR SCREENING MAMMOGRAM: ICD-10-CM

## 2025-02-26 PROCEDURE — 77063 BREAST TOMOSYNTHESIS BI: CPT

## 2025-04-08 ENCOUNTER — OFFICE VISIT (OUTPATIENT)
Dept: URGENT CARE | Facility: URGENT CARE | Age: 40
End: 2025-04-08
Payer: COMMERCIAL

## 2025-04-08 VITALS
OXYGEN SATURATION: 95 % | DIASTOLIC BLOOD PRESSURE: 83 MMHG | WEIGHT: 252 LBS | SYSTOLIC BLOOD PRESSURE: 139 MMHG | TEMPERATURE: 98.4 F | BODY MASS INDEX: 46.09 KG/M2 | RESPIRATION RATE: 18 BRPM | HEART RATE: 87 BPM

## 2025-04-08 DIAGNOSIS — R05.1 ACUTE COUGH: Primary | ICD-10-CM

## 2025-04-08 PROCEDURE — 3075F SYST BP GE 130 - 139MM HG: CPT | Performed by: PHYSICIAN ASSISTANT

## 2025-04-08 PROCEDURE — 3079F DIAST BP 80-89 MM HG: CPT | Performed by: PHYSICIAN ASSISTANT

## 2025-04-08 PROCEDURE — 99213 OFFICE O/P EST LOW 20 MIN: CPT | Performed by: PHYSICIAN ASSISTANT

## 2025-04-08 RX ORDER — ALBUTEROL SULFATE 90 UG/1
2 INHALANT RESPIRATORY (INHALATION) EVERY 6 HOURS
Qty: 18 G | Refills: 0 | Status: SHIPPED | OUTPATIENT
Start: 2025-04-08 | End: 2025-05-08

## 2025-04-08 RX ORDER — AZITHROMYCIN 500 MG/1
500 TABLET, FILM COATED ORAL DAILY
Qty: 5 TABLET | Refills: 0 | Status: SHIPPED | OUTPATIENT
Start: 2025-04-08 | End: 2025-04-13

## 2025-04-08 RX ORDER — BENZONATATE 100 MG/1
100 CAPSULE ORAL 3 TIMES DAILY PRN
Qty: 30 CAPSULE | Refills: 0 | Status: SHIPPED | OUTPATIENT
Start: 2025-04-08 | End: 2025-04-18

## 2025-04-08 NOTE — PROGRESS NOTES
Urgent Care Clinic Visit    Chief Complaint   Patient presents with    Cough     Cough for a few weeks

## 2025-04-08 NOTE — PROGRESS NOTES
Patient presents with:  Cough: Cough for a few weeks       Clinical Decision Making:  Treatment for acute cough with albuterol inhaler with MDI spacer and Tessalon Perles.  Use of respiratory antibiotic for duration of respiratory symptoms. Expected course of resolution and indication for return was gone over and questions were answered to patient/parent's satisfaction before discharge.        ICD-10-CM    1. Acute cough  R05.1 albuterol (PROAIR HFA/PROVENTIL HFA/VENTOLIN HFA) 108 (90 Base) MCG/ACT inhaler     azithromycin (ZITHROMAX) 500 MG tablet     benzonatate (TESSALON) 100 MG capsule          Patient Instructions   You were seen today for acute bronchitis. This is likely due to a viral illness.    Symptom management:  - Get plenty of rest  - Avoid smoking and second hand smoke  - May take tylenol or ibuprofen for fever/discomfort  - Drink plenty of non-caffeinated fluids  - Use nasal steroid spray for sinus congestion  - Albuterol inhaler may be used every 6 hours as needed for chest tightness      Reasons to be seen in the emergency room:  - Develop a fever of 100.4 or higher  - Cough changes, coughing up blood, or become short of breath  - Neck stiffness  - Chest pain  - Severe headache  - Unable to tolerate eating or drinking fluids    Otherwise, if no symptom improvement after 5 days, follow-up with your primary care provider.        HPI:  Jayda Ty is a 40 year old female who presents today for a 3-week history of cough and congestion.  At this time the patient states she has not had shortness of breath, pleuritic chest pain syncope presyncope heart palpitations fever chills night sweats nausea vomiting or diarrhea.  Did perform at home COVID test on the first week of her illness which was returned as negative.  No reported sick contacts.  Has been using over-the-counter Delsym without relief.    History obtained from chart review and the patient.    Problem List:  2022-03: Lentigo 2 mm lower lip    2022: Prediabetes  2022: Hyperinsulinemia  2019: H/O atrial septal defect repair  2019: Nephrolithiasis  2019: Hydronephrosis with ureteropelvic junction (UPJ) obstruction  2019: Right ureteral stone  2019: Bandemia  2015: Hx laparoscopic cholecystectomy  2015: Morbid obesity with BMI of 40.0-44.9, adult (H)  2015: Arrest of descent, delivered, current hospitalization  2015: History of   Polycystic Ovarian Syndrome  Murmurs  Gallbladder Polyp      Past Medical History:   Diagnosis Date    Anxiety     Aortic septal defect 2001    repaired    Arrest of descent, delivered, current hospitalization 2015    Kidney stone 2019    Nephrolithiasis     Ca++ Oxalate     PCOS (polycystic ovarian syndrome)     Pregnant 2015       Social History     Tobacco Use    Smoking status: Never    Smokeless tobacco: Never   Substance Use Topics    Alcohol use: No       Review of Systems  As above in HPI otherwise negative.    Vitals:    25 1406   BP: 139/83   Pulse: 87   Resp: 18   Temp: 98.4  F (36.9  C)   TempSrc: Oral   SpO2: 95%   Weight: 114.3 kg (252 lb)       General: Patient is resting comfortably no acute distress is afebrile  HEENT: Head is normocephalic atraumatic   eyes are PERRL EOMI sclera anicteric   TMs are clear bilaterally  Throat is clear and no exudate  No cervical lymphadenopathy present  LUNGS: Prolonged expiratory wheezes in the bibasilar lung fields.  Normal respiratory effort and excursion.  HEART: Regular rate and rhythm  Skin: Without rash non-diaphoretic    Physical Exam    At the end of the encounter, I discussed results, diagnosis, medications. Discussed red flags for immediate return to clinic/ER, as well as indications for follow up if no improvement. Patient understood and agreed to plan. Patient was stable for discharge.

## 2025-07-19 ENCOUNTER — HEALTH MAINTENANCE LETTER (OUTPATIENT)
Age: 40
End: 2025-07-19